# Patient Record
Sex: FEMALE | Race: WHITE | NOT HISPANIC OR LATINO | Employment: FULL TIME | URBAN - NONMETROPOLITAN AREA
[De-identification: names, ages, dates, MRNs, and addresses within clinical notes are randomized per-mention and may not be internally consistent; named-entity substitution may affect disease eponyms.]

---
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
## 2018-01-27 VITALS
TEMPERATURE: 98.6 F | HEIGHT: 67 IN | BODY MASS INDEX: 35.12 KG/M2 | SYSTOLIC BLOOD PRESSURE: 120 MMHG | HEART RATE: 108 BPM | WEIGHT: 223.8 LBS | DIASTOLIC BLOOD PRESSURE: 70 MMHG

## 2018-02-09 ENCOUNTER — DOCUMENTATION ONLY (OUTPATIENT)
Dept: FAMILY MEDICINE | Facility: OTHER | Age: 18
End: 2018-02-09

## 2018-03-26 ENCOUNTER — HOSPITAL ENCOUNTER (EMERGENCY)
Facility: OTHER | Age: 18
Discharge: HOME | End: 2018-03-26
Admitting: NURSE PRACTITIONER
Payer: COMMERCIAL

## 2018-03-26 VITALS
BODY MASS INDEX: 29.55 KG/M2 | OXYGEN SATURATION: 97 % | HEIGHT: 68 IN | SYSTOLIC BLOOD PRESSURE: 117 MMHG | TEMPERATURE: 97.8 F | WEIGHT: 195 LBS | DIASTOLIC BLOOD PRESSURE: 73 MMHG | RESPIRATION RATE: 20 BRPM | HEART RATE: 82 BPM

## 2018-03-26 DIAGNOSIS — R19.7 DIARRHEA, UNSPECIFIED TYPE: ICD-10-CM

## 2018-03-26 DIAGNOSIS — R11.0 NAUSEA: ICD-10-CM

## 2018-03-26 DIAGNOSIS — R10.11 RIGHT UPPER QUADRANT PAIN: ICD-10-CM

## 2018-03-26 LAB
ALBUMIN SERPL-MCNC: 4 G/DL (ref 3.5–5.7)
ALBUMIN UR-MCNC: NEGATIVE MG/DL
ALP SERPL-CCNC: 93 U/L (ref 34–104)
ALT SERPL W P-5'-P-CCNC: 17 U/L (ref 7–52)
ANION GAP SERPL CALCULATED.3IONS-SCNC: 6 MMOL/L (ref 3–14)
APPEARANCE UR: CLEAR
AST SERPL W P-5'-P-CCNC: 15 U/L (ref 13–39)
BASOPHILS # BLD AUTO: 0 10E9/L (ref 0–0.2)
BASOPHILS NFR BLD AUTO: 0.3 %
BILIRUB SERPL-MCNC: 0.3 MG/DL (ref 0.3–1)
BILIRUB UR QL STRIP: NEGATIVE
BUN SERPL-MCNC: 16 MG/DL (ref 7–25)
CALCIUM SERPL-MCNC: 9.4 MG/DL (ref 8.6–10.3)
CHLORIDE SERPL-SCNC: 108 MMOL/L (ref 98–107)
CO2 SERPL-SCNC: 24 MMOL/L (ref 21–31)
COLOR UR AUTO: YELLOW
CREAT SERPL-MCNC: 0.79 MG/DL (ref 0.6–1.2)
DIFFERENTIAL METHOD BLD: ABNORMAL
EOSINOPHIL # BLD AUTO: 0.1 10E9/L (ref 0–0.7)
EOSINOPHIL NFR BLD AUTO: 1.2 %
ERYTHROCYTE [DISTWIDTH] IN BLOOD BY AUTOMATED COUNT: 11.8 % (ref 10–15)
GFR SERPL CREATININE-BSD FRML MDRD: >90 ML/MIN/1.7M2
GLUCOSE SERPL-MCNC: 90 MG/DL (ref 70–105)
GLUCOSE UR STRIP-MCNC: NEGATIVE MG/DL
HCG UR QL: NEGATIVE
HCT VFR BLD AUTO: 41.7 % (ref 35–47)
HGB BLD-MCNC: 14.1 G/DL (ref 11.7–15.7)
HGB UR QL STRIP: NEGATIVE
IMM GRANULOCYTES # BLD: 0 10E9/L (ref 0–0.4)
IMM GRANULOCYTES NFR BLD: 0.3 %
KETONES UR STRIP-MCNC: NEGATIVE MG/DL
LEUKOCYTE ESTERASE UR QL STRIP: NEGATIVE
LYMPHOCYTES # BLD AUTO: 2.2 10E9/L (ref 0.8–5.3)
LYMPHOCYTES NFR BLD AUTO: 37.8 %
MCH RBC QN AUTO: 27 PG (ref 26.5–33)
MCHC RBC AUTO-ENTMCNC: 33.8 G/DL (ref 31.5–36.5)
MCV RBC AUTO: 80 FL (ref 78–100)
MONOCYTES # BLD AUTO: 0.6 10E9/L (ref 0–1.3)
MONOCYTES NFR BLD AUTO: 9.3 %
NEUTROPHILS # BLD AUTO: 3 10E9/L (ref 1.6–8.3)
NEUTROPHILS NFR BLD AUTO: 51.1 %
NITRATE UR QL: NEGATIVE
PH UR STRIP: 5.5 PH (ref 5–7)
PLATELET # BLD AUTO: 385 10E9/L (ref 150–450)
POTASSIUM SERPL-SCNC: 3.9 MMOL/L (ref 3.5–5.1)
PROT SERPL-MCNC: 6.9 G/DL (ref 6.4–8.9)
RBC # BLD AUTO: 5.22 10E12/L (ref 3.8–5.2)
SODIUM SERPL-SCNC: 138 MMOL/L (ref 134–144)
SOURCE: NORMAL
SP GR UR STRIP: 1.02 (ref 1–1.03)
UROBILINOGEN UR STRIP-ACNC: 0.2 EU/DL (ref 0.2–1)
WBC # BLD AUTO: 5.9 10E9/L (ref 4–11)

## 2018-03-26 PROCEDURE — 99284 EMERGENCY DEPT VISIT MOD MDM: CPT | Mod: Z6 | Performed by: NURSE PRACTITIONER

## 2018-03-26 PROCEDURE — 36415 COLL VENOUS BLD VENIPUNCTURE: CPT | Performed by: NURSE PRACTITIONER

## 2018-03-26 PROCEDURE — 25000128 H RX IP 250 OP 636: Performed by: NURSE PRACTITIONER

## 2018-03-26 PROCEDURE — 99284 EMERGENCY DEPT VISIT MOD MDM: CPT | Mod: 25 | Performed by: NURSE PRACTITIONER

## 2018-03-26 PROCEDURE — 96372 THER/PROPH/DIAG INJ SC/IM: CPT | Performed by: NURSE PRACTITIONER

## 2018-03-26 PROCEDURE — 80053 COMPREHEN METABOLIC PANEL: CPT | Performed by: NURSE PRACTITIONER

## 2018-03-26 PROCEDURE — 85025 COMPLETE CBC W/AUTO DIFF WBC: CPT | Performed by: NURSE PRACTITIONER

## 2018-03-26 PROCEDURE — 81025 URINE PREGNANCY TEST: CPT | Performed by: NURSE PRACTITIONER

## 2018-03-26 PROCEDURE — 81003 URINALYSIS AUTO W/O SCOPE: CPT | Performed by: NURSE PRACTITIONER

## 2018-03-26 RX ORDER — KETOROLAC TROMETHAMINE 30 MG/ML
30 INJECTION, SOLUTION INTRAMUSCULAR; INTRAVENOUS ONCE
Status: COMPLETED | OUTPATIENT
Start: 2018-03-26 | End: 2018-03-26

## 2018-03-26 RX ADMIN — KETOROLAC TROMETHAMINE 30 MG: 30 INJECTION, SOLUTION INTRAMUSCULAR at 15:26

## 2018-03-26 NOTE — ED NOTES
Pt comes in with c/o RUQ pain for three days, decreased appetite. Pain is constant. Pt is nauseated no vomiting, Pt c/o diarrhea X3 today.    Dorothy De Luna RN on 3/26/2018 at 1:08 PM

## 2018-03-26 NOTE — ED PROVIDER NOTES
History   No chief complaint on file.    HPI  Kelsie Danielle is a 17 year old female who presents to the emergency department for evaluation of right upper quadrant abdominal pain. She admits that symptoms have been present since this past Friday. She describes the pain is varying in intensity, does worsen postprandial. She describes as a sharp pain without radiation. Additionally noted complaints of nausea and diarrhea for the past 2 days. She has had decreased appetite. Admits to low-grade fever the past 2 days, no chills.    She has a history of GERD and obesity, has been on PPIs in the past. He describes symptoms different than typical reflux.    Problem List:    Patient Active Problem List    Diagnosis Date Noted     Contraception management 02/27/2014     Priority: Medium     Overview:   Started on Sprintec 2/27/58       Childhood obesity 08/05/2010     Priority: Medium        Past Medical History:    Past Medical History:   Diagnosis Date     Acute suppurative otitis media without spontaneous rupture of tympanic membrane      Fever        Past Surgical History:    No past surgical history on file.    Family History:    Family History   Problem Relation Age of Onset     Hypertension Mother      Hypertension     HEART DISEASE Maternal Grandmother      Heart Disease,Open heart surgery, ASD repair?     HEART DISEASE Paternal Grandfather 49     Heart Disease,CABG     Asthma Paternal Grandfather      Asthma     Other - See Comments Paternal Grandfather      Non-hodgkins lymphoma       Social History:  Marital Status:  Single [1]  Social History   Substance Use Topics     Smoking status: Passive Smoke Exposure - Never Smoker     Smokeless tobacco: Never Used     Alcohol use No        Medications:      Acetaminophen (TYLENOL PO)   etonogestrel (IMPLANON/NEXPLANON) 68 MG IMPL   omeprazole (PRILOSEC) 20 MG CR capsule         Review of Systems   Constitutional: Positive for appetite change and fever. Negative for  "chills.   HENT: Negative.    Respiratory: Negative.  Negative for shortness of breath.    Cardiovascular: Negative.    Gastrointestinal: Positive for abdominal pain, diarrhea and nausea. Negative for vomiting.   Genitourinary: Negative.    Skin: Negative.    Neurological: Negative.    Psychiatric/Behavioral: Negative.        Physical Exam   BP: (!) 153/101  Pulse: 103  Temp: 97.8  F (36.6  C)  Resp: 20  Height: 171.5 cm (5' 7.5\")  Weight: 88.5 kg (195 lb)  SpO2: 99 %      Physical Exam   Constitutional: She is oriented to person, place, and time. She appears well-developed and well-nourished. She is active and cooperative.  Non-toxic appearance. No distress.   HENT:   Head: Normocephalic.   Right Ear: Hearing normal.   Left Ear: Hearing normal.   Nose: Nose normal.   Mouth/Throat: Mucous membranes are normal.   Neck: Neck supple.   Cardiovascular: Regular rhythm, S1 normal, S2 normal and normal heart sounds.  Tachycardia present.    Pulmonary/Chest: Effort normal and breath sounds normal. No respiratory distress.   Abdominal: Normal appearance and bowel sounds are normal. She exhibits no distension. There is tenderness in the right upper quadrant. There is no rigidity, no guarding and negative Arguelles's sign.   Neurological: She is alert and oriented to person, place, and time.   Skin: Skin is warm, dry and intact. She is not diaphoretic.   Psychiatric: She has a normal mood and affect. Her speech is normal and behavior is normal.   Nursing note and vitals reviewed.      ED Course     ED Course     Procedures              Critical Care time:  none               Results for orders placed or performed during the hospital encounter of 03/26/18 (from the past 24 hour(s))   CBC with platelets differential   Result Value Ref Range    WBC 5.9 4.0 - 11.0 10e9/L    RBC Count 5.22 (H) 3.8 - 5.2 10e12/L    Hemoglobin 14.1 11.7 - 15.7 g/dL    Hematocrit 41.7 35.0 - 47.0 %    MCV 80 78 - 100 fl    MCH 27.0 26.5 - 33.0 pg    MCHC " 33.8 31.5 - 36.5 g/dL    RDW 11.8 10.0 - 15.0 %    Platelet Count 385 150 - 450 10e9/L    Diff Method Automated Method     % Neutrophils 51.1 %    % Lymphocytes 37.8 %    % Monocytes 9.3 %    % Eosinophils 1.2 %    % Basophils 0.3 %    % Immature Granulocytes 0.3 %    Absolute Neutrophil 3.0 1.6 - 8.3 10e9/L    Absolute Lymphocytes 2.2 0.8 - 5.3 10e9/L    Absolute Monocytes 0.6 0.0 - 1.3 10e9/L    Absolute Eosinophils 0.1 0.0 - 0.7 10e9/L    Absolute Basophils 0.0 0.0 - 0.2 10e9/L    Abs Immature Granulocytes 0.0 0 - 0.4 10e9/L   Comprehensive metabolic panel   Result Value Ref Range    Sodium 138 134 - 144 mmol/L    Potassium 3.9 3.5 - 5.1 mmol/L    Chloride 108 (H) 98 - 107 mmol/L    Carbon Dioxide 24 21 - 31 mmol/L    Anion Gap 6 3 - 14 mmol/L    Glucose 90 70 - 105 mg/dL    Urea Nitrogen 16 7 - 25 mg/dL    Creatinine 0.79 0.60 - 1.20 mg/dL    GFR Estimate >90 >60 mL/min/1.7m2    GFR Estimate If Black >90 >60 mL/min/1.7m2    Calcium 9.4 8.6 - 10.3 mg/dL    Bilirubin Total 0.3 0.3 - 1.0 mg/dL    Albumin 4.0 3.5 - 5.7 g/dL    Protein Total 6.9 6.4 - 8.9 g/dL    Alkaline Phosphatase 93 34 - 104 U/L    ALT 17 7 - 52 U/L    AST 15 13 - 39 U/L   *UA reflex to Microscopic   Result Value Ref Range    Color Urine Yellow     Appearance Urine Clear     Glucose Urine Negative NEG^Negative mg/dL    Bilirubin Urine Negative NEG^Negative    Ketones Urine Negative NEG^Negative mg/dL    Specific Gravity Urine 1.025 1.003 - 1.035    Blood Urine Negative NEG^Negative    pH Urine 5.5 5.0 - 7.0 pH    Protein Albumin Urine Negative NEG^Negative mg/dL    Urobilinogen Urine 0.2 0.2 - 1.0 EU/dL    Nitrite Urine Negative NEG^Negative    Leukocyte Esterase Urine Negative NEG^Negative    Source Midstream Urine    HCG qualitative urine   Result Value Ref Range    HCG Qual Urine Negative NEG^Negative       Medications   ketorolac (TORADOL) injection 30 mg (not administered)       Assessments & Plan (with Medical Decision Making)     I have  reviewed the nursing notes.    I have reviewed the findings, diagnosis, plan and need for follow up with the patient.       New Prescriptions    No medications on file       Final diagnoses:   Right upper quadrant pain   Nausea   Diarrhea, unspecified type     Patient with RUQ pain, nausea and diarrhea. No leukocytosis and liver enzymes normal. She is afebrile currently. Differential includes possible cholelithiasis versus GI virus. Review of lab results does not suggest acute cholecystitis. She will follow-up in clinic within one week if symptoms worsen or fail to improve, consider US imaging at that time.   Patient has no further questions or concerns. Ready for discharge.     3/26/2018   [facility] AND \[facility] [place] [facility]\""     Olivia Woodruff APRN CNP  03/26/18 1519       Olivia Woodruff APRN CNP  04/27/18 1248

## 2018-03-26 NOTE — DISCHARGE INSTRUCTIONS
Continue to monitor symptoms and limit fatty food that could worsen symptoms. If symptoms not improved within one week, follow-up in clinic. Consider US of gallbladder if symptoms not improved.

## 2018-03-26 NOTE — ED AVS SNAPSHOT
[facility]    [address]    [place] [address]    Phone:  [phone]    Fax:  [phone]                                       Kelsie Danielle   MRN: [id number]    Department:  [facility]   Date of Visit:  3/26/2018           Patient Information     Date Of Birth          2000        Your diagnoses for this visit were:     Right upper quadrant pain     Nausea     Diarrhea, unspecified type       Follow-up Information     Follow up with [facility] In 1 week.    Why:  ER follow-up        Discharge Instructions       Continue to monitor symptoms and limit fatty food that could worsen symptoms. If symptoms not improved within one week, follow-up in clinic. Consider US of gallbladder if symptoms not improved.     24 Hour Appointment Hotline       To make an appointment at any [facility], call [phone] ([phone]). If you don't have a family doctor or clinic, we will help you find one. [facility] clinics are conveniently located to serve the needs of you and your family.             Review of your medicines      Our records show that you are taking the medicines listed below. If these are incorrect, please call your family doctor or clinic.        Dose / Directions Last dose taken    etonogestrel 68 MG Impl   Commonly known as:  IMPLANON/NEXPLANON   Dose:  1 each        Inject 1 each Subcutaneous once   Refills:  0        omeprazole 20 MG CR capsule   Commonly known as:  priLOSEC   Dose:  20 mg        Take 20 mg by mouth daily with food   Refills:  0        TYLENOL PO   Dose:  1000 mg        Take 1,000 mg by mouth   Refills:  0                Procedures and tests performed during your visit     *UA reflex to Microscopic    CBC with platelets differential    Comprehensive metabolic panel    HCG qualitative urine      Orders Needing Specimen Collection     None      Pending Results     No orders found from 3/24/2018 to 3/27/2018.     "        Pending Culture Results     No orders found from 3/24/2018 to 3/27/2018.            Thank you for choosing [facility]       Thank you for choosing [facility] for your care. Our goal is always to provide you with excellent care. Hearing back from our patients is one way we can continue to improve our services. Please take a few minutes to complete the written survey that you may receive in the mail after you visit with us. Thank you!        [employer] Interactive Information     [employer] lets you send messages to your doctor, view your test results, renew your prescriptions, schedule appointments and more. To sign up, go to [email or web address] . Click on \"Log in\" on the left side of the screen, which will take you to the Welcome page. Then click on \"Sign up Now\" on the right side of the page.     You will be asked to enter the access code listed below, as well as some personal information. Please follow the directions to create your username and password.     Your access code is: [email or web address]  Expires: 2018  3:31 PM     Your access code will  in 90 days. If you need help or a new code, please call your [facility] clinic or [phone].        Care EveryWhere ID     This is your Care EveryWhere ID. This could be used by other organizations to access your [facility] medical records  [id number]        Equal Access to Services     ANDREWS RODGERS AH: Hadcrystal mitchell [place], waaxda luqadaha, qaybta kaalmada nicole, radha swan. So [facility] [phone].    ATENCIÓN: Si habla español, tiene a moore disposición servicios gratuitos de asistencia lingüística. Llame al [phone].    We comply with applicable federal civil rights laws and [place] laws. We do not discriminate on the basis of race, color, national origin, age, disability, sex, sexual orientation, or gender identity.            After Visit Summary       This is your record. Keep this with you and show to your community " pharmacist(s) and doctor(s) at your next visit.

## 2018-03-26 NOTE — ED NOTES
Pt given water glass, unable to provide urine sample at this time.    Dorothy De Luna RN on 3/26/2018 at 2:43 PM

## 2018-03-26 NOTE — ED AVS SNAPSHOT
[facility]    [address]    [place] [address]    Phone:  [phone]    Fax:  [phone]                                       Kelsie Danielle   MRN: [id number]    Department:  [facility]   Date of Visit:  3/26/2018           After Visit Summary Signature Page     I have received my discharge instructions, and my questions have been answered. I have discussed any challenges I see with this plan with the nurse or doctor.    ..........................................................................................................................................  Patient/Patient Representative Signature      ..........................................................................................................................................  Patient Representative Print Name and Relationship to Patient    ..................................................               ................................................  Date                                            Time    ..........................................................................................................................................  Reviewed by Signature/Title    ...................................................              ..............................................  Date                                                            Time

## 2018-04-27 ASSESSMENT — ENCOUNTER SYMPTOMS
APPETITE CHANGE: 1
PSYCHIATRIC NEGATIVE: 1
CARDIOVASCULAR NEGATIVE: 1
RESPIRATORY NEGATIVE: 1
VOMITING: 0
SHORTNESS OF BREATH: 0
DIARRHEA: 1
FEVER: 1
CHILLS: 0
ABDOMINAL PAIN: 1
NEUROLOGICAL NEGATIVE: 1
NAUSEA: 1

## 2018-05-10 ENCOUNTER — OFFICE VISIT (OUTPATIENT)
Dept: FAMILY MEDICINE | Facility: OTHER | Age: 18
End: 2018-05-10
Attending: FAMILY MEDICINE
Payer: COMMERCIAL

## 2018-05-10 VITALS
DIASTOLIC BLOOD PRESSURE: 82 MMHG | WEIGHT: 251.4 LBS | BODY MASS INDEX: 38.79 KG/M2 | HEART RATE: 80 BPM | SYSTOLIC BLOOD PRESSURE: 128 MMHG

## 2018-05-10 DIAGNOSIS — Z30.017 NEXPLANON INSERTION: ICD-10-CM

## 2018-05-10 DIAGNOSIS — Z11.8 SPECIAL SCREENING EXAMINATION FOR CHLAMYDIAL DISEASE: Primary | ICD-10-CM

## 2018-05-10 DIAGNOSIS — Z30.46 NEXPLANON REMOVAL: ICD-10-CM

## 2018-05-10 LAB
C TRACH DNA SPEC QL PROBE+SIG AMP: NOT DETECTED
N GONORRHOEA DNA SPEC QL PROBE+SIG AMP: NOT DETECTED
SPECIMEN SOURCE: NORMAL

## 2018-05-10 PROCEDURE — 11983 REMOVE/INSERT DRUG IMPLANT: CPT | Performed by: FAMILY MEDICINE

## 2018-05-10 PROCEDURE — 25000128 H RX IP 250 OP 636: Performed by: FAMILY MEDICINE

## 2018-05-10 PROCEDURE — 87591 N.GONORRHOEAE DNA AMP PROB: CPT | Performed by: FAMILY MEDICINE

## 2018-05-10 PROCEDURE — 87491 CHLMYD TRACH DNA AMP PROBE: CPT | Performed by: FAMILY MEDICINE

## 2018-05-10 RX ADMIN — ETONOGESTREL 68 MG: 68 IMPLANT SUBCUTANEOUS at 15:43

## 2018-05-10 NOTE — NURSING NOTE
Patient here to have nexplanon removed and replaced with a new one.  Neda Stovall............................... 5/10/2018 2:57 PM

## 2018-05-10 NOTE — MR AVS SNAPSHOT
"              After Visit Summary   5/10/2018    Kelsie Danielle    MRN: [id number]           Patient Information     Date Of Birth          2000        Visit Information        Provider Department      5/10/2018 2:45 PM Jessica Cain DO [facility]        Today's Diagnoses     Special screening examination for chlamydial disease    -  1    Nexplanon removal        Nexplanon insertion           Follow-ups after your visit        Who to contact     If you have questions or need follow up information about today's clinic visit or your schedule please contact [facility] directly at [phone].  Normal or non-critical lab and imaging results will be communicated to you by [facility] [employer], letter or phone within 4 business days after the clinic has received the results. If you do not hear from us within 7 days, please contact the clinic through [employer] or phone. If you have a critical or abnormal lab result, we will notify you by phone as soon as possible.  Submit refill requests through [employer] or call your pharmacy and they will forward the refill request to us. Please allow 3 business days for your refill to be completed.          Additional Information About Your Visit        MyChart Information     [employer] lets you send messages to your doctor, view your test results, renew your prescriptions, schedule appointments and more. To sign up, go to [email or web address] . Click on \"Log in\" on the left side of the screen, which will take you to the Welcome page. Then click on \"Sign up Now\" on the right side of the page.     You will be asked to enter the access code listed below, as well as some personal information. Please follow the directions to create your username and password.     Your access code is: [email or web address]  Expires: 2018  3:31 PM     Your access code will  in 90 days. If you need help or a new code, please call your [facility] clinic or [phone].   "      Care EveryWhere ID     This is your Care EveryWhere ID. This could be used by other organizations to access your [facility] medical records  [id number]        Your Vitals Were     Pulse BMI (Body Mass Index)                80 38.79 kg/m2           Blood Pressure from Last 3 Encounters:   05/10/18 128/82   03/26/18 117/73   08/14/17 120/70    Weight from Last 3 Encounters:   05/10/18 251 lb 6.4 oz (114 kg) (>99 %)*   03/26/18 195 lb (88.5 kg) (97 %)*   08/14/17 223 lb 12.8 oz (101.5 kg) (99 %)*     * Growth percentiles are based on [facility] 2-20 Years data.              We Performed the Following     GC/Chlamydia by PCR - [facility]     REMOVAL AND REINSERTION NEXPLANON        Primary Care Provider Fax #    Physician No Ref-Primary [phone]       No address on file        Equal Access to Services     BRYAN RODGERS : Hadii rob Sebastian, wachristina mitchell, koleybrishabh kaalmamarina rivera, radha prabhakar . So [facility] [phone].    ATENCIÓN: Si habla español, tiene a moore disposición servicios gratuitos de asistencia lingüística. Llame al [phone].    We comply with applicable federal civil rights laws and [place] laws. We do not discriminate on the basis of race, color, national origin, age, disability, sex, sexual orientation, or gender identity.            Thank you!     Thank you for choosing [facility] AND hospitals  for your care. Our goal is always to provide you with excellent care. Hearing back from our patients is one way we can continue to improve our services. Please take a few minutes to complete the written survey that you may receive in the mail after your visit with us. Thank you!             Your Updated Medication List - Protect others around you: Learn how to safely use, store and throw away your medicines at www.disposemymeds.org.          This list is accurate as of 5/10/18  8:10 PM.  Always use your most recent med list.                   Brand Name Dispense  Instructions for use Diagnosis    etonogestrel 68 MG Impl    IMPLANON/NEXPLANON     Inject 1 each Subcutaneous once        TYLENOL PO      Take 1,000 mg by mouth

## 2018-05-11 NOTE — PROGRESS NOTES
Chief Complaint:  Nexplanon removal and insertion   Subjective:  Patient is a 17 year old . who presents for Nexplanon removal and insertion.  Pt has Nexplanon placed in L arm in 2015  Desires replacement.  Reviewed risk/benefit and alternatives.    Written and verbal informed consent obtained, risks discussed included:  bleeding, irregular menses, infection, pain/discomfort, cost for removal as well as alternative contraception options.    Pt has No allergy to shellfish/betadine.  Last sexual activity months, + condom.  No pain with sex, No bleeding after sex.   No LMP recorded. Patient has had an implant.  Would like GC/CT screening today with urine sample.    Patient Active Problem List   Diagnosis     Childhood obesity     Contraception management     Past Medical History:   Diagnosis Date     Acute suppurative otitis media without spontaneous rupture of tympanic membrane     ,Left     Fever     ,Lyme and Anaplasma titers pending.     History reviewed. No pertinent surgical history.  Allergies   Allergen Reactions     Amoxicillin Rash     Family History   Problem Relation Age of Onset     Hypertension Mother      Hypertension     HEART DISEASE Maternal Grandmother      Heart Disease,Open heart surgery, ASD repair?     HEART DISEASE Paternal Grandfather 49     Heart Disease,CABG     Asthma Paternal Grandfather      Asthma     Other - See Comments Paternal Grandfather      Non-hodgkins lymphoma       Review of Systems  Constitutional: negative  2. Eyes: negative  3. ENT/MOUTH: negative  4. CARDIOVASCULAR: negative  5. RESPIRATORY: negative  6. GASTROINTESTINAL: negative  7. GENITOURINARY: negative  8. MUSCULOSKELETAL: negative  9. SKIN/BREAST: negative  10. NEUROLOGICAL: negative  11. PSYCHIATRIC: negative  12. ENDOCRINE: negative  13. HEMAT/LYMPH: negative  14. ALLER/IMMUN: see allergies       Objective:  Vitals:    05/10/18 1458   BP: 128/82   BP Location: Right arm   Patient Position: Sitting    Cuff Size: Adult Large   Pulse: 80   Weight: 251 lb 6.4 oz (114 kg)     Procedure: Nexplanon Removal from L arm and Placement of new Nexplanon into L Arm  Indication:  Desired continuation of contraception  The exact location of the implant in the arm be verified by palpation in L arm   Chlorhexadine prep used to clean site, allowed to dry.  2cc of 1% lidocaine injected just under the skin along the planned re insertion tunnel.  1 mL 1% lidocaine injected at the marked site for removal.  Downward pressure on proximal end of the implant for stabilization,  bulge noted indicating the distal end of the implant.  Longitudinal incision of 2 mm towards the elbow from distal tip of implant with 11 scapel. Gentle pressure on  the implant towards the incision until the tip is visible.   Forceps gently insert a forceps into the incision - tissue around the implant dissected and implant grasped with forceps. Confirmation of entire implant, 4 cm long, was removed.      Pt. Remained in supine position with L arm bent at 90 degrees.  A second time out was performed.   Position for insertion identified on L upper arm 8cm from medial epicondyl at site of removal. Nexplanon placed subdermally under sterile conditions without difficulty. Sukhjinder in place and confirmed via palpation by provider and patient.  Pressure held, sterile bandage placed. L upper arm wrapped in pressure dressing.  Patient tolerated procedure well.   Complications:  none  EBL:  < 1 mL    Assessment/Plan  -Nexplanon replaced and inserted   - Routine return precautions (bleeding, redness, pain, concern for infection) discussed.  Pt. given card with Lot #, date of insertion, date of removal by 5/10/2021.  Remove the pressure bandage in 24 hours and the small bandage in 3 to 5 days   - RTO in 1 week for recheck prn

## 2018-11-17 ENCOUNTER — OFFICE VISIT (OUTPATIENT)
Dept: FAMILY MEDICINE | Facility: OTHER | Age: 18
End: 2018-11-17
Attending: PHYSICIAN ASSISTANT
Payer: COMMERCIAL

## 2018-11-17 VITALS
BODY MASS INDEX: 41.21 KG/M2 | HEIGHT: 67 IN | TEMPERATURE: 98.9 F | DIASTOLIC BLOOD PRESSURE: 84 MMHG | SYSTOLIC BLOOD PRESSURE: 120 MMHG | WEIGHT: 262.6 LBS | OXYGEN SATURATION: 97 % | HEART RATE: 107 BPM

## 2018-11-17 DIAGNOSIS — R30.0 DYSURIA: Primary | ICD-10-CM

## 2018-11-17 LAB
ALBUMIN UR-MCNC: NEGATIVE MG/DL
APPEARANCE UR: CLEAR
BACTERIA #/AREA URNS HPF: ABNORMAL /HPF
BILIRUB UR QL STRIP: NEGATIVE
COLOR UR AUTO: YELLOW
GLUCOSE UR STRIP-MCNC: NEGATIVE MG/DL
HGB UR QL STRIP: ABNORMAL
KETONES UR STRIP-MCNC: NEGATIVE MG/DL
LEUKOCYTE ESTERASE UR QL STRIP: ABNORMAL
MUCOUS THREADS #/AREA URNS LPF: PRESENT /LPF
NITRATE UR QL: NEGATIVE
NON-SQ EPI CELLS #/AREA URNS LPF: ABNORMAL /LPF
PH UR STRIP: 6 PH (ref 5–9)
RBC #/AREA URNS AUTO: ABNORMAL /HPF
SOURCE: ABNORMAL
SP GR UR STRIP: 1.02 (ref 1–1.03)
UROBILINOGEN UR STRIP-ACNC: 0.2 EU/DL (ref 0.2–1)
WBC #/AREA URNS AUTO: ABNORMAL /HPF

## 2018-11-17 PROCEDURE — 81001 URINALYSIS AUTO W/SCOPE: CPT | Performed by: FAMILY MEDICINE

## 2018-11-17 PROCEDURE — 99213 OFFICE O/P EST LOW 20 MIN: CPT | Performed by: FAMILY MEDICINE

## 2018-11-17 RX ORDER — FLUCONAZOLE 150 MG/1
150 TABLET ORAL ONCE
Qty: 1 TABLET | Refills: 1 | Status: SHIPPED | OUTPATIENT
Start: 2018-11-17 | End: 2018-11-17

## 2018-11-17 RX ORDER — SULFAMETHOXAZOLE/TRIMETHOPRIM 800-160 MG
1 TABLET ORAL 2 TIMES DAILY
Qty: 6 TABLET | Refills: 0 | Status: SHIPPED | OUTPATIENT
Start: 2018-11-17 | End: 2018-11-20

## 2018-11-17 ASSESSMENT — PAIN SCALES - GENERAL: PAINLEVEL: EXTREME PAIN (8)

## 2018-11-17 NOTE — PROGRESS NOTES
"  SUBJECTIVE:   Kelsie Danielle is a 18 year old female who presents to clinic today for the following health issues:  Possible yeast infection  HPI Comments: Patient arrives here because of dysuria.  She states it hurts to urinate.  She also reports frequency.  And hesitancy.  Symptoms been going on for the last couple of days.  No back pain no fevers or chills.  She normally has a vaginal discharge but reports is become itchy.  Denies foul-smelling discharge.  Also d reports a remote chance of STD    Vaginal Problem            Patient Active Problem List    Diagnosis Date Noted     Contraception management 02/27/2014     Priority: Medium     Overview:   Started on Sprintec 2/27/58       Childhood obesity 08/05/2010     Priority: Medium     Past Medical History:   Diagnosis Date     Acute suppurative otitis media without spontaneous rupture of tympanic membrane     2003,Left     Fever     2008,Lyme and Anaplasma titers pending.      No past surgical history on file.    Review of Systems   Genitourinary: Positive for vaginal discharge.        OBJECTIVE:     /84 (BP Location: Right arm, Patient Position: Sitting, Cuff Size: Adult Large)  Pulse 107  Temp 98.9  F (37.2  C) (Tympanic)  Ht 5' 7\" (1.702 m)  Wt 262 lb 9.6 oz (119.1 kg)  SpO2 97%  Breastfeeding? No  BMI 41.13 kg/m2  Body mass index is 41.13 kg/(m^2).  Physical Exam   Constitutional: She appears well-developed.   Pulmonary/Chest: Effort normal.   Abdominal: Soft. She exhibits no distension. There is no tenderness. There is no rebound.   Musculoskeletal: Normal range of motion. She exhibits no edema or tenderness.   Neurological: She is alert.       Diagnostic Test Results:  Results for orders placed or performed in visit on 11/17/18 (from the past 24 hour(s))   Urinalysis w Reflex Microscopic If Positive   Result Value Ref Range    Color Urine Yellow     Appearance Urine Clear     Glucose Urine Negative NEG^Negative mg/dL    Bilirubin Urine " Negative NEG^Negative    Ketones Urine Negative NEG^Negative mg/dL    Specific Gravity Urine 1.025 1.000 - 1.030    Blood Urine Large (A) NEG^Negative    pH Urine 6.0 5.0 - 9.0 pH    Protein Albumin Urine Negative NEG^Negative mg/dL    Urobilinogen Urine 0.2 0.2 - 1.0 EU/dL    Nitrite Urine Negative NEG^Negative    Leukocyte Esterase Urine Small (A) NEG^Negative    Source Midstream Urine    Urine Microscopic   Result Value Ref Range    WBC Urine 0 - 5 OTO5^0 - 5 /HPF    RBC Urine 10-25 (A) OTO2^O - 2 /HPF    Squamous Epithelial /LPF Urine Few FEW^Few /LPF    Bacteria Urine Few (A) NEG^Negative /HPF    Mucous Urine Present (A) NEG^Negative /LPF       ASSESSMENT/PLAN:         1. Dysuria  We will initially treat for UTI.  If she does not get good improvement 1 dose of Diflucan.  If no improvement after that she will need a formal pelvic.  I did offer STD testing but she declined.  - Urinalysis w Reflex Microscopic If Positive  - Urine Microscopic  - sulfamethoxazole-trimethoprim (BACTRIM DS/SEPTRA DS) 800-160 MG per tablet; Take 1 tablet by mouth 2 times daily for 3 days  Dispense: 6 tablet; Refill: 0  - fluconazole (DIFLUCAN) 150 MG tablet; Take 1 tablet (150 mg) by mouth once for 1 dose  Dispense: 1 tablet; Refill: 1      Haris Borjas MD  [facility]

## 2018-11-17 NOTE — NURSING NOTE
Patient presents with possible yeast infection, pain with voiding, burning after voiding, and vaginal discharge for 2 days. Pt is taking cranberry pills.   Deneen Gillespie LPN....................  11/17/2018   10:20 AM

## 2018-11-17 NOTE — MR AVS SNAPSHOT
"              After Visit Summary   11/17/2018    Kelsie Danielle    MRN: [id number]           Patient Information     Date Of Birth          2000        Visit Information        Provider Department      11/17/2018 10:30 AM Haris Borjas MD [facility]        Today's Diagnoses     Dysuria    -  1       Follow-ups after your visit        Your next 10 appointments already scheduled     Dec 14, 2018 10:00 AM CST   Office Visit with Jessica Cain DO   [facility] ([facility])    [address]  [place] [id number] [phone]           Bring a current list of meds and any records pertaining to this visit. For Physicals, please bring immunization records and any forms needing to be filled out. Please arrive 10 minutes early to complete paperwork.              Who to contact     If you have questions or need follow up information about today's clinic visit or your schedule please contact [facility] directly at [phone].  Normal or non-critical lab and imaging results will be communicated to you by [employer], letter or phone within 4 business days after the clinic has received the results. If you do not hear from us within 7 days, please contact the clinic through [employer] or phone. If you have a critical or abnormal lab result, we will notify you by phone as soon as possible.  Submit refill requests through [employer] or call your pharmacy and they will forward the refill request to us. Please allow 3 business days for your refill to be completed.          Additional Information About Your Visit        [employer] Information     [employer] lets you send messages to your doctor, view your test results, renew your prescriptions, schedule appointments and more. To sign up, go to [email or web address] . Click on \"Log in\" on the left side of the screen, which will take you to the Welcome page. Then click on \"Sign up Now\" " "on the right side of the page.     You will be asked to enter the access code listed below, as well as some personal information. Please follow the directions to create your username and password.     Your access code is: [email or web address]  Expires: 2/15/2019 11:07 AM     Your access code will  in 90 days. If you need help or a new code, please call your [facility] or [phone].        Care EveryWhere ID     This is your Care EveryWhere ID. This could be used by other organizations to access your [facility] medical records  [id number]        Your Vitals Were     Pulse Temperature Height Pulse Oximetry Breastfeeding? BMI (Body Mass Index)    107 98.9  F (37.2  C) (Tympanic) 5' 7\" (1.702 m) 97% No 41.13 kg/m2       Blood Pressure from Last 3 Encounters:   18 120/84   05/10/18 128/82   18 117/73    Weight from Last 3 Encounters:   18 262 lb 9.6 oz (119.1 kg)   05/10/18 251 lb 6.4 oz (114 kg) (>99 %)*   18 195 lb (88.5 kg) (97 %)*     * Growth percentiles are based on CDC 2-20 Years data.              We Performed the Following     Urinalysis w Reflex Microscopic If Positive     Urine Microscopic          Today's Medication Changes          These changes are accurate as of 18 11:07 AM.  If you have any questions, ask your nurse or doctor.               Start taking these medicines.        Dose/Directions    fluconazole 150 MG tablet   Commonly known as:  DIFLUCAN   Used for:  Dysuria   Started by:  Haris Borjas MD        Dose:  150 mg   Take 1 tablet (150 mg) by mouth once for 1 dose   Quantity:  1 tablet   Refills:  1       sulfamethoxazole-trimethoprim 800-160 MG per tablet   Commonly known as:  BACTRIM DS/SEPTRA DS   Used for:  Dysuria   Started by:  Haris Borjas MD        Dose:  1 tablet   Take 1 tablet by mouth 2 times daily for 3 days   Quantity:  6 tablet   Refills:  0            Where to get your medicines      These medications were sent to [id number] - " [place], [place] - [address], [facility] [address]     Phone:  [phone]     sulfamethoxazole-trimethoprim 800-160 MG per tablet         Some of these will need a paper prescription and others can be bought over the counter.  Ask your nurse if you have questions.     Bring a paper prescription for each of these medications     fluconazole 150 MG tablet                Primary Care Provider Fax #    Physician No Ref-Primary [phone]       No address on file        Equal Access to Services     ANDREWS RODGERS : Hadii aad ku hadasho Soomaali, waaxda luqadaha, qaybta kaalmada adeegyada, waxay keithin haymegan juniormadinadinora prabhakar . So [facility] [phone].    ATENCIÓN: Si habla español, tiene a moore disposición servicios gratuitos de asistencia lingüística. [facility] [phone].    We comply with applicable federal civil rights laws and [place] laws. We do not discriminate on the basis of race, color, national origin, age, disability, sex, sexual orientation, or gender identity.            Thank you!     Thank you for choosing [facility] AND [facility]  for your care. Our goal is always to provide you with excellent care. Hearing back from our patients is one way we can continue to improve our services. Please take a few minutes to complete the written survey that you may receive in the mail after your visit with us. Thank you!             Your Updated Medication List - Protect others around you: Learn how to safely use, store and throw away your medicines at www.disposemymeds.org.          This list is accurate as of 11/17/18 11:07 AM.  Always use your most recent med list.                   Brand Name Dispense Instructions for use Diagnosis    etonogestrel 68 MG Impl    IMPLANON/NEXPLANON     Inject 1 each Subcutaneous once        fluconazole 150 MG tablet    DIFLUCAN    1 tablet    Take 1 tablet (150 mg) by mouth once for 1 dose    Dysuria       sulfamethoxazole-trimethoprim  800-160 MG per tablet    BACTRIM DS/SEPTRA DS    6 tablet    Take 1 tablet by mouth 2 times daily for 3 days    Dysuria       TYLENOL PO      Take 1,000 mg by mouth

## 2018-11-20 ENCOUNTER — TELEPHONE (OUTPATIENT)
Dept: FAMILY MEDICINE | Facility: OTHER | Age: 18
End: 2018-11-20

## 2018-11-20 NOTE — TELEPHONE ENCOUNTER
Patient is wanting to know if she can test positive for herpes without having an outbreak of it?   She states she was possibly exposed to it 3-4 years ago and is having pain while urinating. She was treated for a UTI and yeast infection with no relief and is wondering if it is herpes now.        Dee Leone LPN on 11/20/2018 at 12:36 PM

## 2018-11-20 NOTE — TELEPHONE ENCOUNTER
Herpes doesn't cause pain with urination unless there is an open sore contributing to the pain.  Also the blood test for HSV types 1 and 2 will be positive if you have ever had it or even have a history of cold sores; which does not tell us any new information.  If there is a lesion or sore, we can take a swab of that area to test for HSV as a culture.    Jessica Cain, DO

## 2018-11-20 NOTE — TELEPHONE ENCOUNTER
Left message to call back....................  11/20/2018   1:45 PM  Marlen Taylor LPN  11/20/2018  1:45 PM

## 2018-11-21 ENCOUNTER — OFFICE VISIT (OUTPATIENT)
Dept: FAMILY MEDICINE | Facility: OTHER | Age: 18
End: 2018-11-21
Attending: NURSE PRACTITIONER
Payer: COMMERCIAL

## 2018-11-21 VITALS
BODY MASS INDEX: 41.66 KG/M2 | TEMPERATURE: 98 F | WEIGHT: 266 LBS | SYSTOLIC BLOOD PRESSURE: 146 MMHG | DIASTOLIC BLOOD PRESSURE: 90 MMHG | HEART RATE: 97 BPM | OXYGEN SATURATION: 98 %

## 2018-11-21 DIAGNOSIS — N76.0 VAGINITIS AND VULVOVAGINITIS: Primary | ICD-10-CM

## 2018-11-21 LAB
ALBUMIN UR-MCNC: ABNORMAL MG/DL
APPEARANCE UR: CLEAR
BACTERIA #/AREA URNS HPF: ABNORMAL /HPF
BILIRUB UR QL STRIP: NEGATIVE
COLOR UR AUTO: YELLOW
GLUCOSE UR STRIP-MCNC: NEGATIVE MG/DL
HGB UR QL STRIP: ABNORMAL
KETONES UR STRIP-MCNC: NEGATIVE MG/DL
LEUKOCYTE ESTERASE UR QL STRIP: ABNORMAL
MUCOUS THREADS #/AREA URNS LPF: PRESENT /LPF
NITRATE UR QL: NEGATIVE
NON-SQ EPI CELLS #/AREA URNS LPF: ABNORMAL /LPF
PH UR STRIP: 7 PH (ref 5–9)
RBC #/AREA URNS AUTO: ABNORMAL /HPF
SOURCE: ABNORMAL
SP GR UR STRIP: 1.02 (ref 1–1.03)
SPECIMEN SOURCE: NORMAL
UROBILINOGEN UR STRIP-ACNC: 1 EU/DL (ref 0.2–1)
WBC #/AREA URNS AUTO: ABNORMAL /HPF
WET PREP SPEC: NORMAL

## 2018-11-21 PROCEDURE — 81001 URINALYSIS AUTO W/SCOPE: CPT | Performed by: NURSE PRACTITIONER

## 2018-11-21 PROCEDURE — 87086 URINE CULTURE/COLONY COUNT: CPT | Performed by: NURSE PRACTITIONER

## 2018-11-21 PROCEDURE — 99214 OFFICE O/P EST MOD 30 MIN: CPT | Performed by: NURSE PRACTITIONER

## 2018-11-21 PROCEDURE — 87210 SMEAR WET MOUNT SALINE/INK: CPT | Performed by: NURSE PRACTITIONER

## 2018-11-21 PROCEDURE — 87529 HSV DNA AMP PROBE: CPT | Performed by: NURSE PRACTITIONER

## 2018-11-21 RX ORDER — SULFAMETHOXAZOLE/TRIMETHOPRIM 800-160 MG
1 TABLET ORAL 2 TIMES DAILY
COMMUNITY
End: 2018-11-27

## 2018-11-21 NOTE — PATIENT INSTRUCTIONS
Will have you follow up with PCP next week for re-evaluation and to review results.   Tuesday at 11 AM.       Preventing Vaginitis     Use mild, unscented soap when you bathe or shower to avoid irritating your vagina.    Vaginitis is irritation or infection of the vagina or vulva (the outside opening of the vagina). Vaginitis can be caused by bacteria, viruses, parasites, or yeast. Chemicals (such as in perfumes or soaps or in spermicides) can sometimes be a cause. Vaginitis can be caused by hormone changes in pregnancy or with menopause. You can help prevent vaginitis. Follow the tips below. And see your healthcare provider if you have any symptoms.  Hygiene    Avoid chemicals. Do not use vaginal sprays. Do not use scented toilet paper or tampons that are scented. Sprays and scents have chemicals that can irritate your vagina.    Do not douche unless you are told to by your healthcare provider. Douching is rarely needed. And it upsets the normal balance in the vagina.    Wash yourself well. Wash the outer vaginal area (vulva) every day with mild, unscented soap. Keep it as dry as possible.    Wipe correctly. Make sure to wipe from front to back after a bowel movement. This helps keep from spreading bacteria from your anus to your vagina.    Change your tampon often. During your period, make sure to change your tampon as often as directed on the package. This allows the normal flow of vaginal discharge and blood.  Lifestyle    Limit your number of sexual partners. The more partners you have, the greater your risk of infection. Using condoms helps reduce your risk.    Get enough sleep. Sleep helps keep your body s immune system healthy. This helps you fight infection.    Lose weight, if needed. Excess weight can reduce air circulation around your vagina. This can increase your risk of infection.    Exercise regularly. Regular activity helps keep your body healthy.    Take antibiotics only as directed. Antibiotics can  change the normal chemical balance in the vagina.    Clothing    Don t sit in wet clothes. Yeast thrives when it s warm and damp.    Don t wear tight pants. And don t wear tights, leggings, or hose without a cotton crotch. These types of clothing trap warmth and moisture.    Wear cotton underwear. Cotton lets air circulate around the vagina.  Symptoms of vaginitis    Irritation, swelling, or itching of the genital area    Vaginal discharge    Bad vaginal odor    Pain or burning during urination   Date Last Reviewed: 12/1/2016 2000-2018 [employer]. [address] [place] [address]. All rights reserved. This information is not intended as a substitute for professional medical care. Always follow your healthcare professional's instructions.

## 2018-11-21 NOTE — PROGRESS NOTES
Chief Complaint   Patient presents with     Vaginal Problem      SUBJECTIVE:     Exposed to herpes 5 years ago.   Her sister is now  to him and has herpes.   Has vaginal pain, hurts to sit down and go to the bathroom.   Was here 5 days ago, UTI and wet prep done. Treated for UTI and yeast with no improvement of symptoms.   LMP - doesn't get one. Has had her period since July.   Has had the same partner now for 2 years. Had chlamydia last year, boyfriend had gotten it from his ex-girlfriend.     No LMP recorded. Patient has had an implant.    OBJECTIVE:   She appears well, afebrile.  Abdomen: benign, soft, nontender, no masses.  Pelvic Exam: wet prep and viral swab collected, pelvic exam not performed, exam chaperoned by nurse.     ASSESSMENT:   Results for orders placed or performed in visit on 11/21/18   UA reflex to Microscopic and Culture   Result Value Ref Range    Color Urine Yellow     Appearance Urine Clear     Glucose Urine Negative NEG^Negative mg/dL    Bilirubin Urine Negative NEG^Negative    Ketones Urine Negative NEG^Negative mg/dL    Specific Gravity Urine 1.020 1.000 - 1.030    Blood Urine Large (A) NEG^Negative    pH Urine 7.0 5.0 - 9.0 pH    Protein Albumin Urine Trace (A) NEG^Negative mg/dL    Urobilinogen Urine 1.0 0.2 - 1.0 EU/dL    Nitrite Urine Negative NEG^Negative    Leukocyte Esterase Urine Small (A) NEG^Negative    Source Midstream Urine    Urine Microscopic   Result Value Ref Range    WBC Urine 10-25 (A) OTO5^0 - 5 /HPF    RBC Urine 10-25 (A) OTO2^O - 2 /HPF    Squamous Epithelial /LPF Urine Many (A) FEW^Few /LPF    Bacteria Urine Many (A) NEG^Negative /HPF    Mucous Urine Present (A) NEG^Negative /LPF   Wet Prep, Genital   Result Value Ref Range    Specimen Description Vagina     Wet Prep No clue cells seen     Wet Prep No yeast seen     Wet Prep No Trichomonas seen     Wet Prep Many WBCs seen      Kelsie was seen today for vaginal problem.    Diagnoses and all orders for this  visit:    Vaginitis and vulvovaginitis  -     Wet Prep, Genital  -     Herpes Simplex Virus 1 and 2 IgG; Future  -     UA reflex to Microscopic and Culture  -     Cancel: GC/Chlamydia by PCR  -     Cancel: Herpes Simplex Virus 1 and 2 IgG  -     Cancel: GC/Chlamydia by PCR  -     HSV 1 and 2 DNA by PCR  -     Urine Microscopic  -     Urine Culture Aerobic Bacterial      Reviewed available labs with patient.   Will need to analisa for HSV and GC/Chlam to come back.   Advised to schedule f/u with PCP to review next week.   ROV prn if symptoms persist or worsen.    DEEPA Haskins, NP-C  [facility]

## 2018-11-21 NOTE — MR AVS SNAPSHOT
After Visit Summary   11/21/2018    Kelsie Danielle    MRN: [id number]           Patient Information     Date Of Birth          2000        Visit Information        Provider Department      11/21/2018 11:30 AM Chelle Sanford NP [facility]        Today's Diagnoses     Vaginitis and vulvovaginitis    -  1      Care Instructions    Will have you follow up with PCP next week for re-evaluation and to review results.   Tuesday at 11 AM.       Preventing Vaginitis     Use mild, unscented soap when you bathe or shower to avoid irritating your vagina.    Vaginitis is irritation or infection of the vagina or vulva (the outside opening of the vagina). Vaginitis can be caused by bacteria, viruses, parasites, or yeast. Chemicals (such as in perfumes or soaps or in spermicides) can sometimes be a cause. Vaginitis can be caused by hormone changes in pregnancy or with menopause. You can help prevent vaginitis. Follow the tips below. And see your healthcare provider if you have any symptoms.  Hygiene    Avoid chemicals. Do not use vaginal sprays. Do not use scented toilet paper or tampons that are scented. Sprays and scents have chemicals that can irritate your vagina.    Do not douche unless you are told to by your healthcare provider. Douching is rarely needed. And it upsets the normal balance in the vagina.    Wash yourself well. Wash the outer vaginal area (vulva) every day with mild, unscented soap. Keep it as dry as possible.    Wipe correctly. Make sure to wipe from front to back after a bowel movement. This helps keep from spreading bacteria from your anus to your vagina.    Change your tampon often. During your period, make sure to change your tampon as often as directed on the package. This allows the normal flow of vaginal discharge and blood.  Lifestyle    Limit your number of sexual partners. The more partners you have, the greater your risk of infection. Using condoms  helps reduce your risk.    Get enough sleep. Sleep helps keep your body s immune system healthy. This helps you fight infection.    Lose weight, if needed. Excess weight can reduce air circulation around your vagina. This can increase your risk of infection.    Exercise regularly. Regular activity helps keep your body healthy.    Take antibiotics only as directed. Antibiotics can change the normal chemical balance in the vagina.    Clothing    Don t sit in wet clothes. Yeast thrives when it s warm and damp.    Don t wear tight pants. And don t wear tights, leggings, or hose without a cotton crotch. These types of clothing trap warmth and moisture.    Wear cotton underwear. Cotton lets air circulate around the vagina.  Symptoms of vaginitis    Irritation, swelling, or itching of the genital area    Vaginal discharge    Bad vaginal odor    Pain or burning during urination   Date Last Reviewed: 12/1/2016 2000-2018 [employer]. [address] [place] [address]. All rights reserved. This information is not intended as a substitute for professional medical care. Always follow your healthcare professional's instructions.                Follow-ups after your visit        Follow-up notes from your care team     Return for as scheduled.      Your next 10 appointments already scheduled     Nov 27, 2018 11:00 AM CST   SHORT with Jessica Cain DO   [facility]    [address]  [place] [address]   [phone]            Dec 14, 2018 10:00 AM CST   Office Visit with Jessica Cain DO   [facility] ([facility])    [address]  [place] [address]   [phone]           Bring a current list of meds and any records pertaining to this visit. For Physicals, please bring immunization records and any forms needing to be filled out. Please arrive 10 minutes early to complete  "paperwork.              Who to contact     If you have questions or need follow up information about today's clinic visit or your schedule please contact [facility] directly at [phone].  Normal or non-critical lab and imaging results will be communicated to you by MyChart, letter or phone within 4 business days after the clinic has received the results. If you do not hear from us within 7 days, please contact the clinic through [employer] or phone. If you have a critical or abnormal lab result, we will notify you by phone as soon as possible.  Submit refill requests through [employer] or call your pharmacy and they will forward the refill request to us. Please allow 3 business days for your refill to be completed.          Additional Information About Your Visit        [facility] Information     [employer] lets you send messages to your doctor, view your test results, renew your prescriptions, schedule appointments and more. To sign up, go to [email or web address] . Click on \"Log in\" on the left side of the screen, which will take you to the Welcome page. Then click on \"Sign up Now\" on the right side of the page.     You will be asked to enter the access code listed below, as well as some personal information. Please follow the directions to create your username and password.     Your access code is: [email or web address]  Expires: 2/15/2019 11:07 AM     Your access code will  in 90 days. If you need help or a new code, please call your [place] clinic or [phone].        Care EveryWhere ID     This is your Care EveryWhere ID. This could be used by other organizations to access your [place] medical records  [id number]        Your Vitals Were     Pulse Temperature Pulse Oximetry Breastfeeding? BMI (Body Mass Index)       97 98  F (36.7  C) (Tympanic) 98% No 41.66 kg/m2        Blood Pressure from Last 3 Encounters:   18 146/90   18 120/84   05/10/18 128/82    Weight from Last 3 " Encounters:   11/21/18 266 lb (120.7 kg)   11/17/18 262 lb 9.6 oz (119.1 kg)   05/10/18 251 lb 6.4 oz (114 kg) (>99 %)*     * Growth percentiles are based on [facility] 2-20 Years data.              We Performed the Following     HSV 1 and 2 DNA by PCR     UA reflex to Microscopic and Culture     Urine Culture Aerobic Bacterial     Urine Microscopic     Wet Prep, Genital        Primary Care Provider Fax #    Physician No Ref-Primary [phone]       No address on file        Equal Access to Services     ANDREWS RODGERS : Hadii rob ku hadasho Soomaali, waaxda luqadaha, qaybta kaalmada adeegyada, radha prabhakar . So [facility] [phone].    ATENCIÓN: Si habla español, tiene a moore disposición servicios gratuitos de asistencia lingüística. Llame al [phone].    We comply with applicable federal civil rights laws and [place] laws. We do not discriminate on the basis of race, color, national origin, age, disability, sex, sexual orientation, or gender identity.            Thank you!     Thank you for choosing [facility] AND [facility]  for your care. Our goal is always to provide you with excellent care. Hearing back from our patients is one way we can continue to improve our services. Please take a few minutes to complete the written survey that you may receive in the mail after your visit with us. Thank you!             Your Updated Medication List - Protect others around you: Learn how to safely use, store and throw away your medicines at www.disposemymeds.org.          This list is accurate as of 11/21/18  1:41 PM.  Always use your most recent med list.                   Brand Name Dispense Instructions for use Diagnosis    etonogestrel 68 MG Impl    IMPLANON/NEXPLANON     Inject 1 each Subcutaneous once        sulfamethoxazole-trimethoprim 800-160 MG per tablet    BACTRIM DS/SEPTRA DS     Take 1 tablet by mouth 2 times daily        TYLENOL PO      Take 1,000 mg by mouth

## 2018-11-21 NOTE — NURSING NOTE
Patient presents to clinic today for sores in her genitals. The sores hurt. Patient slept with a man 5 years ago. He is now  to her sister. Her sister has herpes. Patient is now worried about this.  Antonette Alvarez CMA..............11/21/2018........11:56 AM    Medication Reconciliation: complete    Antonette Alvarez CMA

## 2018-11-22 LAB
BACTERIA SPEC CULT: NORMAL
HSV1 DNA SPEC QL NAA+PROBE: POSITIVE
HSV2 DNA SPEC QL NAA+PROBE: NEGATIVE
SPECIMEN SOURCE: ABNORMAL
SPECIMEN SOURCE: NORMAL

## 2018-11-27 ENCOUNTER — OFFICE VISIT (OUTPATIENT)
Dept: FAMILY MEDICINE | Facility: OTHER | Age: 18
End: 2018-11-27
Attending: FAMILY MEDICINE
Payer: COMMERCIAL

## 2018-11-27 VITALS
HEART RATE: 90 BPM | SYSTOLIC BLOOD PRESSURE: 128 MMHG | BODY MASS INDEX: 41.75 KG/M2 | DIASTOLIC BLOOD PRESSURE: 80 MMHG | HEIGHT: 67 IN | WEIGHT: 266 LBS | TEMPERATURE: 98.2 F

## 2018-11-27 DIAGNOSIS — A60.00 GENITAL HERPES SIMPLEX, UNSPECIFIED SITE: Primary | ICD-10-CM

## 2018-11-27 DIAGNOSIS — N89.8 VAGINAL ITCHING: ICD-10-CM

## 2018-11-27 DIAGNOSIS — Z31.69 PRE-CONCEPTION COUNSELING: ICD-10-CM

## 2018-11-27 LAB
ALBUMIN UR-MCNC: NEGATIVE MG/DL
APPEARANCE UR: CLEAR
BACTERIA #/AREA URNS HPF: ABNORMAL /HPF
BILIRUB UR QL STRIP: NEGATIVE
COLOR UR AUTO: YELLOW
GLUCOSE UR STRIP-MCNC: NEGATIVE MG/DL
HGB UR QL STRIP: ABNORMAL
KETONES UR STRIP-MCNC: NEGATIVE MG/DL
LEUKOCYTE ESTERASE UR QL STRIP: NEGATIVE
NITRATE UR QL: NEGATIVE
NON-SQ EPI CELLS #/AREA URNS LPF: ABNORMAL /LPF
PH UR STRIP: 6.5 PH (ref 5–9)
RBC #/AREA URNS AUTO: ABNORMAL /HPF
SOURCE: ABNORMAL
SP GR UR STRIP: 1.02 (ref 1–1.03)
UROBILINOGEN UR STRIP-ACNC: 0.2 EU/DL (ref 0.2–1)
WBC #/AREA URNS AUTO: ABNORMAL /HPF

## 2018-11-27 PROCEDURE — 99213 OFFICE O/P EST LOW 20 MIN: CPT | Performed by: FAMILY MEDICINE

## 2018-11-27 PROCEDURE — 81001 URINALYSIS AUTO W/SCOPE: CPT | Performed by: FAMILY MEDICINE

## 2018-11-27 RX ORDER — MISOPROSTOL 200 UG/1
400 TABLET ORAL ONCE
Qty: 2 TABLET | Refills: 0 | Status: SHIPPED | OUTPATIENT
Start: 2018-11-27 | End: 2018-11-27

## 2018-11-27 RX ORDER — VALACYCLOVIR HYDROCHLORIDE 500 MG/1
500 TABLET, FILM COATED ORAL 2 TIMES DAILY
Qty: 6 TABLET | Refills: 3 | Status: SHIPPED | OUTPATIENT
Start: 2018-11-27 | End: 2021-05-12

## 2018-11-27 ASSESSMENT — PAIN SCALES - GENERAL: PAINLEVEL: NO PAIN (0)

## 2018-11-27 NOTE — MR AVS SNAPSHOT
"              After Visit Summary   11/27/2018    Kelsie Danielle    MRN: [id number]           Patient Information     Date Of Birth          2000        Visit Information        Provider Department      11/27/2018 11:00 AM Jessica Cain DO [facility]        Today's Diagnoses     Genital herpes simplex, unspecified site    -  1    Vaginal itching        Pre-conception counseling           Follow-ups after your visit        Your next 10 appointments already scheduled     Dec 14, 2018 10:00 AM CST   Office Visit with Jessica Cain DO   [facility] ([facility])    [address]  [place] [address] [phone]           Bring a current list of meds and any records pertaining to this visit. For Physicals, please bring immunization records and any forms needing to be filled out. Please arrive 10 minutes early to complete paperwork.              Who to contact     If you have questions or need follow up information about today's clinic visit or your schedule please contact [facility] directly at [phone].  Normal or non-critical lab and imaging results will be communicated to you by [employer], letter or phone within 4 business days after the clinic has received the results. If you do not hear from us within 7 days, please contact the clinic through [employer] or phone. If you have a critical or abnormal lab result, we will notify you by phone as soon as possible.  Submit refill requests through [email or web address] or call your pharmacy and they will forward the refill request to us. Please allow 3 business days for your refill to be completed.          Additional Information About Your Visit        MyChart Information     [email or web address] lets you send messages to your doctor, view your test results, renew your prescriptions, schedule appointments and more. To sign up, go to [email or web address] . Click on \"Log in\" on the left " "side of the screen, which will take you to the Welcome page. Then click on \"Sign up Now\" on the right side of the page.     You will be asked to enter the access code listed below, as well as some personal information. Please follow the directions to create your username and password.     Your access code is: [email or web address]  Expires: 2/15/2019 11:07 AM     Your access code will  in 90 days. If you need help or a new code, please call your [facility] or [phone].        Care EveryWhere ID     This is your Care EveryWhere ID. This could be used by other organizations to access your [place] medical records  [id number]        Your Vitals Were     Pulse Temperature Height Breastfeeding? BMI (Body Mass Index)       90 98.2  F (36.8  C) (Tympanic) 5' 7\" (1.702 m) No 41.66 kg/m2        Blood Pressure from Last 3 Encounters:   18 128/80   18 146/90   18 120/84    Weight from Last 3 Encounters:   18 266 lb (120.7 kg)   18 266 lb (120.7 kg)   18 262 lb 9.6 oz (119.1 kg)              We Performed the Following     Urinalysis w Reflex Microscopic If Positive     Urine Microscopic          Today's Medication Changes          These changes are accurate as of 18 12:52 PM.  If you have any questions, ask your nurse or doctor.               Start taking these medicines.        Dose/Directions    misoprostol 200 MCG tablet   Commonly known as:  CYTOTEC   Used for:  Pre-conception counseling   Started by:  Jessica Cain DO        Dose:  400 mcg   Take 2 tablets (400 mcg) by mouth once for 1 dose   Quantity:  2 tablet   Refills:  0       valACYclovir 500 MG tablet   Commonly known as:  VALTREX   Used for:  Genital herpes simplex, unspecified site   Started by:  Jessica Cain DO        Dose:  500 mg   Take 1 tablet (500 mg) by mouth 2 times daily for 3 days.   Quantity:  6 tablet   Refills:  3            Where to get your medicines      These medications were sent to " [id number] [address] [address]     Phone:  [phone]     misoprostol 200 MCG tablet    valACYclovir 500 MG tablet                Primary Care Provider Fax #    Physician No Ref-Primary [phone]       No address on file        Equal Access to Services     ANDREWS RODGERS : Hadii aad ku hadasho Soomaali, waaxda luqadaha, qaybta kaalmada adeegyada, radha parkerin haychrisn adequiana khmadinadinora prabhakar . So [facility] [phone].    ATENCIÓN: Si habla español, tiene a moore disposición servicios gratuitos de asistencia lingüística. Guille al [phone].    We comply with applicable federal civil rights laws and [place] laws. We do not discriminate on the basis of race, color, national origin, age, disability, sex, sexual orientation, or gender identity.            Thank you!     Thank you for choosing [facility]  for your care. Our goal is always to provide you with excellent care. Hearing back from our patients is one way we can continue to improve our services. Please take a few minutes to complete the written survey that you may receive in the mail after your visit with us. Thank you!             Your Updated Medication List - Protect others around you: Learn how to safely use, store and throw away your medicines at www.disposemymeds.org.          This list is accurate as of 11/27/18 12:52 PM.  Always use your most recent med list.                   Brand Name Dispense Instructions for use Diagnosis    etonogestrel 68 MG Impl    IMPLANON/NEXPLANON     Inject 1 each Subcutaneous once        misoprostol 200 MCG tablet    CYTOTEC    2 tablet    Take 2 tablets (400 mcg) by mouth once for 1 dose    Pre-conception counseling       valACYclovir 500 MG tablet    VALTREX    6 tablet    Take 1 tablet (500 mg) by mouth 2 times daily for 3 days.    Genital herpes simplex, unspecified site

## 2018-11-27 NOTE — PROGRESS NOTES
"Nursing Notes:   Opal Mckeon LPN  11/27/2018 11:16 AM  Unsigned  Chief Complaint   Patient presents with     Urinary Problem    Patient states that she was having pain with urinating and sores that have now resolved.     Initial /80 (BP Location: Right arm, Patient Position: Sitting, Cuff Size: Adult Large)  Pulse 90  Temp 98.2  F (36.8  C) (Tympanic)  Ht 5' 7\" (1.702 m)  Wt 266 lb (120.7 kg)  Breastfeeding? No  BMI 41.66 kg/m2 Estimated body mass index is 41.66 kg/(m^2) as calculated from the following:    Height as of this encounter: 5' 7\" (1.702 m).    Weight as of this encounter: 266 lb (120.7 kg).  Medication Reconciliation: complete    Opal Mckeon LPN    SUBJECTIVE:   Kelsie Danielle is a 18 year old female who presents to clinic today for the following health issues:    JHONNY Iglesias is here for follow up to testing performed in the [facility] last week.  She was having ~8-10 days of perineal pressure/burning, and ultimately developed sores later into the course.  Had exposure to genital herpes ~4-5 years ago; who is now in a relationship with Kelsie's sister.  She also has genital herpes.  Sores have resolves.  No fever/chills.  No blood in urine.    Also has an appointment in ~2 weeks to discuss contraception.  Has been spotting for ~4-5 months while on the Nexplanon.  Previously had without any difficulty.  Desiring it to be removed; uncertain what she wants to do next.  Likes not having period; boyfriend is also a  and may be leaving for extended periods of time - and may use condoms.      Patient Active Problem List    Diagnosis Date Noted     Contraception management 02/27/2014     Priority: Medium     Overview:   Started on Sprintec 2/27/58       Childhood obesity 08/05/2010     Priority: Medium     Past Medical History:   Diagnosis Date     Acute suppurative otitis media without spontaneous rupture of tympanic membrane     2003,Left     Fever     2008,Lyme and Anaplasma " "titers pending.      History reviewed. No pertinent surgical history.  Family History   Problem Relation Age of Onset     Hypertension Mother      Hypertension     HEART DISEASE Maternal Grandmother      Heart Disease,Open heart surgery, ASD repair?     HEART DISEASE Paternal Grandfather 49     Heart Disease,CABG     Asthma Paternal Grandfather      Asthma     Other - See Comments Paternal Grandfather      Non-hodgkins lymphoma     Social History   Substance Use Topics     Smoking status: Never Smoker     Smokeless tobacco: Never Used     Alcohol use No     Social History     Social History Narrative    Lives with mom and 2 siblings.  Agueda Grandmother  Mary Carmen Mom     Current Outpatient Prescriptions   Medication Sig Dispense Refill     etonogestrel (IMPLANON/NEXPLANON) 68 MG IMPL Inject 1 each Subcutaneous once       Allergies   Allergen Reactions     Amoxicillin Rash     Review of Systems   All other systems reviewed and are negative.     OBJECTIVE:     /80 (BP Location: Right arm, Patient Position: Sitting, Cuff Size: Adult Large)  Pulse 90  Temp 98.2  F (36.8  C) (Tympanic)  Ht 5' 7\" (1.702 m)  Wt 266 lb (120.7 kg)  Breastfeeding? No  BMI 41.66 kg/m2  Body mass index is 41.66 kg/(m^2).  Physical Exam   Constitutional: She appears well-developed and well-nourished. No distress.   HENT:   Head: Normocephalic and atraumatic.   Right Ear: External ear normal.   Left Ear: External ear normal.   Cardiovascular: Normal rate and normal heart sounds.    Pulmonary/Chest: Effort normal and breath sounds normal.   Skin: She is not diaphoretic.   Psychiatric: She has a normal mood and affect. Judgment normal.   Nursing note and vitals reviewed.    Diagnostic Test Results:  none     ASSESSMENT/PLAN:     1. Genital herpes simplex, unspecified site  Discussed outbreak therapy vs suppressive therapy.  Rx for valtrex 500mg BID x 3 days for each outbreak.  Reviewed if she is getting more than 8 per year to let me know. "  Reviewed recommendations for pregnancy, partner reduction, use of condoms.  - valACYclovir (VALTREX) 500 MG tablet; Take 1 tablet (500 mg) by mouth 2 times daily for 3 days.  Dispense: 6 tablet; Refill: 3    2. Vaginal itching  Repeat UA due to recent UTI with blood noted on UA.  However this is not likely contributing to current symptoms.  Blood accounted for by vaginal spotting.  - Urinalysis w Reflex Microscopic If Positive  - Urine Microscopic    3. Pre-conception counseling  Reviewed some options for contraception; including possible IUD placement on 12/14/18.  More information given for her to read; and Rx for cytotec Rx in case she would like to proceed with IUD placement.  Instructed to take the night before.  Follow up then for Nexplanon removal and further discussion.  - misoprostol (CYTOTEC) 200 MCG tablet; Take 2 tablets (400 mcg) by mouth once for 1 dose  Dispense: 2 tablet; Refill: 0      DO KASSIDY [facility] \[facility] [place] [facility]\""

## 2018-11-27 NOTE — NURSING NOTE
"Chief Complaint   Patient presents with     Urinary Problem    Patient states that she was having pain with urinating and sores that have now resolved.     Initial /80 (BP Location: Right arm, Patient Position: Sitting, Cuff Size: Adult Large)  Pulse 90  Temp 98.2  F (36.8  C) (Tympanic)  Ht 5' 7\" (1.702 m)  Wt 266 lb (120.7 kg)  Breastfeeding? No  BMI 41.66 kg/m2 Estimated body mass index is 41.66 kg/(m^2) as calculated from the following:    Height as of this encounter: 5' 7\" (1.702 m).    Weight as of this encounter: 266 lb (120.7 kg).  Medication Reconciliation: complete    Opal Mckeon LPN    "

## 2020-11-04 ENCOUNTER — TELEPHONE (OUTPATIENT)
Dept: FAMILY MEDICINE | Facility: OTHER | Age: 20
End: 2020-11-04

## 2020-11-04 NOTE — TELEPHONE ENCOUNTER
After verifying pts name and date of birth with patient, patient states she is needing a letter to return to work after having her COVID swab. Notified pt that we did not do the COVID swab here at [facility] and she should call  in [place] to get her result and bring those to her employer.  Myriam Keller LPN

## 2021-01-12 ENCOUNTER — OFFICE VISIT (OUTPATIENT)
Dept: FAMILY MEDICINE | Facility: OTHER | Age: 21
End: 2021-01-12
Attending: FAMILY MEDICINE
Payer: COMMERCIAL

## 2021-01-12 VITALS
WEIGHT: 287 LBS | SYSTOLIC BLOOD PRESSURE: 156 MMHG | BODY MASS INDEX: 44.95 KG/M2 | HEART RATE: 113 BPM | RESPIRATION RATE: 18 BRPM | OXYGEN SATURATION: 97 % | DIASTOLIC BLOOD PRESSURE: 84 MMHG | TEMPERATURE: 98.2 F

## 2021-01-12 DIAGNOSIS — B36.0 PITYRIASIS VERSICOLOR: Primary | ICD-10-CM

## 2021-01-12 DIAGNOSIS — F41.8 SITUATIONAL ANXIETY: ICD-10-CM

## 2021-01-12 DIAGNOSIS — F50.819 BINGE EATING DISORDER: ICD-10-CM

## 2021-01-12 DIAGNOSIS — E66.9 OBESITY, UNSPECIFIED CLASSIFICATION, UNSPECIFIED OBESITY TYPE, UNSPECIFIED WHETHER SERIOUS COMORBIDITY PRESENT: ICD-10-CM

## 2021-01-12 LAB
ALBUMIN SERPL-MCNC: 4.3 G/DL (ref 3.5–5.7)
ALP SERPL-CCNC: 74 U/L (ref 34–104)
ALT SERPL W P-5'-P-CCNC: 22 U/L (ref 7–52)
ANION GAP SERPL CALCULATED.3IONS-SCNC: 8 MMOL/L (ref 3–14)
AST SERPL W P-5'-P-CCNC: 18 U/L (ref 13–39)
BASOPHILS # BLD AUTO: 0 10E9/L (ref 0–0.2)
BASOPHILS NFR BLD AUTO: 0.4 %
BILIRUB SERPL-MCNC: 0.3 MG/DL (ref 0.3–1)
BUN SERPL-MCNC: 12 MG/DL (ref 7–25)
CALCIUM SERPL-MCNC: 9.6 MG/DL (ref 8.6–10.3)
CHLORIDE SERPL-SCNC: 106 MMOL/L (ref 98–107)
CHOLEST SERPL-MCNC: 177 MG/DL
CO2 SERPL-SCNC: 25 MMOL/L (ref 21–31)
CREAT SERPL-MCNC: 0.7 MG/DL (ref 0.6–1.2)
DIFFERENTIAL METHOD BLD: ABNORMAL
EOSINOPHIL # BLD AUTO: 0.1 10E9/L (ref 0–0.7)
EOSINOPHIL NFR BLD AUTO: 1 %
ERYTHROCYTE [DISTWIDTH] IN BLOOD BY AUTOMATED COUNT: 11.6 % (ref 10–15)
ESTRADIOL SERPL-MCNC: 40 PG/ML
GFR SERPL CREATININE-BSD FRML MDRD: >90 ML/MIN/{1.73_M2}
GLUCOSE SERPL-MCNC: 114 MG/DL (ref 70–105)
HCT VFR BLD AUTO: 41.6 % (ref 35–47)
HDLC SERPL-MCNC: 27 MG/DL (ref 23–92)
HGB BLD-MCNC: 13.8 G/DL (ref 11.7–15.7)
IMM GRANULOCYTES # BLD: 0 10E9/L (ref 0–0.4)
IMM GRANULOCYTES NFR BLD: 0.3 %
LDLC SERPL CALC-MCNC: 94 MG/DL
LYMPHOCYTES # BLD AUTO: 3.4 10E9/L (ref 0.8–5.3)
LYMPHOCYTES NFR BLD AUTO: 34.5 %
MCH RBC QN AUTO: 27.4 PG (ref 26.5–33)
MCHC RBC AUTO-ENTMCNC: 33.2 G/DL (ref 31.5–36.5)
MCV RBC AUTO: 83 FL (ref 78–100)
MONOCYTES # BLD AUTO: 0.7 10E9/L (ref 0–1.3)
MONOCYTES NFR BLD AUTO: 7.4 %
NEUTROPHILS # BLD AUTO: 5.5 10E9/L (ref 1.6–8.3)
NEUTROPHILS NFR BLD AUTO: 56.4 %
NONHDLC SERPL-MCNC: 150 MG/DL
PLATELET # BLD AUTO: 460 10E9/L (ref 150–450)
POTASSIUM SERPL-SCNC: 4.4 MMOL/L (ref 3.5–5.1)
PROT SERPL-MCNC: 7 G/DL (ref 6.4–8.9)
RBC # BLD AUTO: 5.03 10E12/L (ref 3.8–5.2)
SODIUM SERPL-SCNC: 139 MMOL/L (ref 134–144)
T4 FREE SERPL-MCNC: 0.89 NG/DL (ref 0.6–1.6)
TESTOST SERPL-MCNC: 52 NG/DL (ref 14–76)
TRIGL SERPL-MCNC: 280 MG/DL
TSH SERPL DL<=0.05 MIU/L-ACNC: 3.06 IU/ML (ref 0.34–5.6)
WBC # BLD AUTO: 9.8 10E9/L (ref 4–11)

## 2021-01-12 PROCEDURE — 80053 COMPREHEN METABOLIC PANEL: CPT | Mod: ZL | Performed by: FAMILY MEDICINE

## 2021-01-12 PROCEDURE — 84443 ASSAY THYROID STIM HORMONE: CPT | Mod: ZL | Performed by: FAMILY MEDICINE

## 2021-01-12 PROCEDURE — 80061 LIPID PANEL: CPT | Mod: ZL | Performed by: FAMILY MEDICINE

## 2021-01-12 PROCEDURE — 99214 OFFICE O/P EST MOD 30 MIN: CPT | Performed by: FAMILY MEDICINE

## 2021-01-12 PROCEDURE — 82670 ASSAY OF TOTAL ESTRADIOL: CPT | Mod: ZL | Performed by: FAMILY MEDICINE

## 2021-01-12 PROCEDURE — 85025 COMPLETE CBC W/AUTO DIFF WBC: CPT | Mod: ZL | Performed by: FAMILY MEDICINE

## 2021-01-12 PROCEDURE — 36415 COLL VENOUS BLD VENIPUNCTURE: CPT | Mod: ZL | Performed by: FAMILY MEDICINE

## 2021-01-12 PROCEDURE — 84439 ASSAY OF FREE THYROXINE: CPT | Mod: ZL | Performed by: FAMILY MEDICINE

## 2021-01-12 PROCEDURE — 84403 ASSAY OF TOTAL TESTOSTERONE: CPT | Mod: ZL | Performed by: FAMILY MEDICINE

## 2021-01-12 RX ORDER — LISDEXAMFETAMINE DIMESYLATE 30 MG/1
30 CAPSULE ORAL DAILY
Qty: 30 CAPSULE | Refills: 0 | Status: SHIPPED | OUTPATIENT
Start: 2021-02-11 | End: 2021-03-13

## 2021-01-12 RX ORDER — KETOCONAZOLE 20 MG/G
CREAM TOPICAL DAILY
Qty: 60 G | Refills: 2 | Status: SHIPPED | OUTPATIENT
Start: 2021-01-12 | End: 2021-05-12

## 2021-01-12 RX ORDER — LISDEXAMFETAMINE DIMESYLATE 30 MG/1
30 CAPSULE ORAL DAILY
Qty: 30 CAPSULE | Refills: 0 | Status: SHIPPED | OUTPATIENT
Start: 2021-01-12 | End: 2021-02-11

## 2021-01-12 RX ORDER — ALPRAZOLAM 0.5 MG
0.5 TABLET ORAL DAILY PRN
Qty: 30 TABLET | Refills: 1 | Status: SHIPPED | OUTPATIENT
Start: 2021-01-12 | End: 2022-03-17

## 2021-01-12 SDOH — HEALTH STABILITY: MENTAL HEALTH: HOW OFTEN DO YOU HAVE A DRINK CONTAINING ALCOHOL?: NOT ASKED

## 2021-01-12 SDOH — HEALTH STABILITY: MENTAL HEALTH: HOW MANY STANDARD DRINKS CONTAINING ALCOHOL DO YOU HAVE ON A TYPICAL DAY?: NOT ASKED

## 2021-01-12 SDOH — HEALTH STABILITY: MENTAL HEALTH: HOW OFTEN DO YOU HAVE 6 OR MORE DRINKS ON ONE OCCASION?: NOT ASKED

## 2021-01-12 ASSESSMENT — PAIN SCALES - GENERAL: PAINLEVEL: NO PAIN (0)

## 2021-01-12 ASSESSMENT — PATIENT HEALTH QUESTIONNAIRE - PHQ9: SUM OF ALL RESPONSES TO PHQ QUESTIONS 1-9: 13

## 2021-01-12 NOTE — PROGRESS NOTES
"  SUBJECTIVE:   Kelsie Danielle is a 20 year old female who presents to clinic today for the following health issues:    JHONNY Iglesias is here for concerns of a rash that is chronic in nature; but is now gradually spreading to involve her back.  She has tried many OTC creams, topical steroids to the area without significant improvement.  Heat, ? seems to make it worse.  Not bothersome, itchy, weepy, etc in nature.  Recent Covid: N  Others around her with symptoms: N    She also is desiring to discuss some anxiety that has been increasing. 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  Therefore, she is having a hard time \"connecting\" with the other aids now as they don't see her as a manager.  Transition to this role has been difficult.  Up at night.  Worrying.  Ruminating about the work day.  Finding she is getting anxious about the next day.  Can wake up multiple times through the night.  Has self medicated with marijuana in the past.  She would potentially be interested in medical marijuana as an option.  Has previously been on sertraline, which made her feel like a zombie.  This trial was approximately 3 months in duration.  She has also been on Seroquel to help with sleep and moods.  She is uncertain about the effect of this medication.  She is finding hard time losing weight as well.  She has tried decreasing and even stopping her intake of pop for the last year and has not had any weight loss.  + binge eating.  Would be interested in trying Vyvanse to see if that is helpful.    She is due for a physical, Pap smear.  She is currently on the Nexplanon, and rarely has menstrual cycles.    Patient Active Problem List    Diagnosis Date Noted     Genital herpes simplex, unspecified site 11/27/2018     Priority: Medium     Contraception management 02/27/2014     Priority: Medium     Overview:   Started on Sprintec 2/27/58       Childhood obesity 08/05/2010     Priority: " Medium     Past Medical History:   Diagnosis Date     Acute suppurative otitis media without spontaneous rupture of tympanic membrane     2003,Left     Fever     2008,Lyme and Anaplasma titers pending.      No past surgical history on file.  Family History   Problem Relation Age of Onset     Hypertension Mother         Hypertension     Heart Disease Maternal Grandmother         Heart Disease,Open heart surgery, ASD repair?     Heart Disease Paternal Grandfather 49        Heart Disease,CABG     Asthma Paternal Grandfather         Asthma     Other - See Comments Paternal Grandfather         Non-hodgkins lymphoma     Social History     Tobacco Use     Smoking status: Never Smoker     Smokeless tobacco: Never Used   Substance Use Topics     Alcohol use: Yes     Comment: occasionally     Social History     Social History Narrative    Lives with mom and 2 siblings.  Agueda Grandmother  Mary Carmen Mom     Current Outpatient Medications   Medication Sig Dispense Refill     ALPRAZolam (XANAX) 0.5 MG tablet Take 1 tablet (0.5 mg) by mouth daily as needed for anxiety 30 tablet 1     ketoconazole (NIZORAL) 2 % external cream Apply topically daily 60 g 2     lisdexamfetamine (VYVANSE) 30 MG capsule Take 1 capsule (30 mg) by mouth daily 30 capsule 0     [START ON 2/11/2021] lisdexamfetamine (VYVANSE) 30 MG capsule Take 1 capsule (30 mg) by mouth daily 30 capsule 0     etonogestrel (IMPLANON/NEXPLANON) 68 MG IMPL Inject 1 each Subcutaneous once       valACYclovir (VALTREX) 500 MG tablet Take 1 tablet (500 mg) by mouth 2 times daily for 3 days. 6 tablet 3     Allergies   Allergen Reactions     Amoxicillin Rash     Review of Systems   Constitutional: Positive for fatigue. Negative for appetite change, chills and fever.   Respiratory: Negative for cough, choking and shortness of breath.    Cardiovascular: Negative for chest pain.   Skin: Positive for rash. Negative for wound.   Psychiatric/Behavioral: Positive for sleep disturbance.  Negative for hallucinations and suicidal ideas. The patient is nervous/anxious. The patient is not hyperactive.    All other systems reviewed and are negative.       OBJECTIVE:     BP (!) 156/84   Pulse 113   Temp 98.2  F (36.8  C) (Tympanic)   Resp 18   Wt 130.2 kg (287 lb)   SpO2 97%   BMI 44.95 kg/m    Body mass index is 44.95 kg/m .  Physical Exam  Vitals signs and nursing note reviewed.   Constitutional:       Appearance: Normal appearance. She is obese.   HENT:      Head: Normocephalic and atraumatic.      Right Ear: Tympanic membrane and ear canal normal.      Left Ear: Tympanic membrane and ear canal normal.   Neck:      Musculoskeletal: Normal range of motion.   Cardiovascular:      Rate and Rhythm: Normal rate and regular rhythm.   Pulmonary:      Effort: Pulmonary effort is normal.      Breath sounds: Normal breath sounds.   Skin:     Capillary Refill: Capillary refill takes less than 2 seconds.             Comments: Well demarcated but serpiginous border of hyperpigmented only slightly raised plaques.  Involving between breasts and in lower back, following spine.   Neurological:      Mental Status: She is alert.   Psychiatric:         Mood and Affect: Mood normal.         Diagnostic Test Results:  Results for orders placed or performed in visit on 01/12/21   Testosterone, Total     Status: None   Result Value Ref Range    Testosterone Total 52 14 - 76 ng/dL   Estradiol     Status: None   Result Value Ref Range    Estradiol 40 pg/mL   CBC and Differential     Status: Abnormal   Result Value Ref Range    WBC 9.8 4.0 - 11.0 10e9/L    RBC Count 5.03 3.8 - 5.2 10e12/L    Hemoglobin 13.8 11.7 - 15.7 g/dL    Hematocrit 41.6 35.0 - 47.0 %    MCV 83 78 - 100 fl    MCH 27.4 26.5 - 33.0 pg    MCHC 33.2 31.5 - 36.5 g/dL    RDW 11.6 10.0 - 15.0 %    Platelet Count 460 (H) 150 - 450 10e9/L    Diff Method Automated Method     % Neutrophils 56.4 %    % Lymphocytes 34.5 %    % Monocytes 7.4 %    % Eosinophils 1.0 %     % Basophils 0.4 %    % Immature Granulocytes 0.3 %    Absolute Neutrophil 5.5 1.6 - 8.3 10e9/L    Absolute Lymphocytes 3.4 0.8 - 5.3 10e9/L    Absolute Monocytes 0.7 0.0 - 1.3 10e9/L    Absolute Eosinophils 0.1 0.0 - 0.7 10e9/L    Absolute Basophils 0.0 0.0 - 0.2 10e9/L    Abs Immature Granulocytes 0.0 0 - 0.4 10e9/L   Comprehensive Metabolic Panel     Status: Abnormal   Result Value Ref Range    Sodium 139 134 - 144 mmol/L    Potassium 4.4 3.5 - 5.1 mmol/L    Chloride 106 98 - 107 mmol/L    Carbon Dioxide 25 21 - 31 mmol/L    Anion Gap 8 3 - 14 mmol/L    Glucose 114 (H) 70 - 105 mg/dL    Urea Nitrogen 12 7 - 25 mg/dL    Creatinine 0.70 0.60 - 1.20 mg/dL    GFR Estimate >90 >60 mL/min/[1.73_m2]    GFR Estimate If Black >90 >60 mL/min/[1.73_m2]    Calcium 9.6 8.6 - 10.3 mg/dL    Bilirubin Total 0.3 0.3 - 1.0 mg/dL    Albumin 4.3 3.5 - 5.7 g/dL    Protein Total 7.0 6.4 - 8.9 g/dL    Alkaline Phosphatase 74 34 - 104 U/L    ALT 22 7 - 52 U/L    AST 18 13 - 39 U/L   Lipid Panel     Status: Abnormal   Result Value Ref Range    Cholesterol 177 <200 mg/dL    Triglycerides 280 (H) <150 mg/dL    HDL Cholesterol 27 23 - 92 mg/dL    LDL Cholesterol Calculated 94 <100 mg/dL    Non HDL Cholesterol 150 (H) <130 mg/dL   T4, Free     Status: None   Result Value Ref Range    T4 Free 0.89 0.60 - 1.60 ng/dL   TSH     Status: None   Result Value Ref Range    Thyrotropin 3.06 0.34 - 5.60 IU/mL       ASSESSMENT/PLAN:     1. Pityriasis versicolor  Worsening; Rx for ketoconazole topical.  If not effective, discussed treatment with oral itraconazole vs fluconazole.  Will notify me in ~1-2 weeks if not improving.  - ketoconazole (NIZORAL) 2 % external cream; Apply topically daily  Dispense: 60 g; Refill: 2    2. Situational anxiety  Acute on chronic.  Mostly with new position change at work recently.  Rx for xanax to take primarily in the evening for relaxation.  Consideration of daily medication due to likely chronic stress with work  until she adjusts.  - ALPRAZolam (XANAX) 0.5 MG tablet; Take 1 tablet (0.5 mg) by mouth daily as needed for anxiety  Dispense: 30 tablet; Refill: 1    3. Binge eating disorder  Chronic, with obesity.  Discussed possible PCOS, will obtain labs as ordered below to evaluate for other cause (thyroid, possible PCOS).  Will plan for two month trial of Vyvanse for binge eating.  Discussed schedule 2 medications and desirability/abuse potential.  Will need follow up in the office if she desire to continue.  - TSH; Future  - T4, Free; Future  - Lipid Panel; Future  - Comprehensive Metabolic Panel; Future  - CBC and Differential; Future  - Estradiol; Future  - Testosterone, Total; Future  - lisdexamfetamine (VYVANSE) 30 MG capsule; Take 1 capsule (30 mg) by mouth daily  Dispense: 30 capsule; Refill: 0  - lisdexamfetamine (VYVANSE) 30 MG capsule; Take 1 capsule (30 mg) by mouth daily  Dispense: 30 capsule; Refill: 0  - Testosterone, Total  - Estradiol  - CBC and Differential  - Comprehensive Metabolic Panel  - Lipid Panel  - T4, Free  - TSH    4. Obesity, unspecified classification, unspecified obesity type, unspecified whether serious comorbidity present  In addition to #3.  See above.  Encouraged healthy lifestyle.  Cut out sugary beverages, work on getting 150 minutes of physical activity weekly, reduce carbohydrates in diet to 80-90g/day to start (plan for further reduction if able/tolerated).  - TSH; Future  - T4, Free; Future  - Lipid Panel; Future  - Comprehensive Metabolic Panel; Future  - CBC and Differential; Future  - Estradiol; Future  - Testosterone, Total; Future  - Testosterone, Total  - Estradiol  - CBC and Differential  - Comprehensive Metabolic Panel  - Lipid Panel  - T4, Free  - TSH      Jessica Cain, [facility] AND \[facility]\""

## 2021-01-12 NOTE — NURSING NOTE
"Chief Complaint   Patient presents with     Derm Problem       Initial BP (!) 158/84   Pulse 113   Temp 98.2  F (36.8  C) (Tympanic)   Resp 18   Wt 130.2 kg (287 lb)   SpO2 97%   BMI 44.95 kg/m   Estimated body mass index is 44.95 kg/m  as calculated from the following:    Height as of 11/27/18: 1.702 m (5' 7\").    Weight as of this encounter: 130.2 kg (287 lb).  Medication Reconciliation: complete    Marlen Taylor LPN  "

## 2021-01-13 ENCOUNTER — MYC MEDICAL ADVICE (OUTPATIENT)
Dept: FAMILY MEDICINE | Facility: OTHER | Age: 21
End: 2021-01-13

## 2021-01-14 NOTE — TELEPHONE ENCOUNTER
Please initiate a PA on her vyvanse if not already done.  Marlen Taylor LPN, PELONN  1/14/2021  11:44 AM

## 2021-01-15 ENCOUNTER — HEALTH MAINTENANCE LETTER (OUTPATIENT)
Age: 21
End: 2021-01-15

## 2021-01-15 ASSESSMENT — ENCOUNTER SYMPTOMS
WOUND: 0
CHILLS: 0
APPETITE CHANGE: 0
NERVOUS/ANXIOUS: 1
FEVER: 0
FATIGUE: 1
HALLUCINATIONS: 0
HYPERACTIVE: 0
COUGH: 0
SHORTNESS OF BREATH: 0
CHOKING: 0
SLEEP DISTURBANCE: 1

## 2021-02-04 ENCOUNTER — TELEPHONE (OUTPATIENT)
Dept: FAMILY MEDICINE | Facility: OTHER | Age: 21
End: 2021-02-04

## 2021-02-04 NOTE — TELEPHONE ENCOUNTER
Fax from [facility] stating that the vyvanse is still not approved. They are requesting to consider an alternative.  Marlen Taylor LPN, LPN  2/4/2021  8:43 AM

## 2021-02-11 ENCOUNTER — TELEPHONE (OUTPATIENT)
Dept: FAMILY MEDICINE | Facility: OTHER | Age: 21
End: 2021-02-11

## 2021-02-11 NOTE — TELEPHONE ENCOUNTER
Please call patient.  Her RX for Vivance is not covered by her insurance.  She needs a different formulary option.       Danette Caceres on 2/11/2021 at 4:30 PM

## 2021-02-12 NOTE — TELEPHONE ENCOUNTER
Patient notified. She does not want to do the weight management referral at this time.  Marlen Taylor, ADRIAN, LPN  2/12/2021  1:37 PM

## 2021-02-12 NOTE — TELEPHONE ENCOUNTER
No other stimulant is used for Binge Eating; therefore I would recommend a Weight Management referral to [facility] if she wants to proceed with treatment.  Let me know how she would want to proceed.    Jessica Cain, DO

## 2021-02-12 NOTE — TELEPHONE ENCOUNTER
Left message to call back....................  2/12/2021   10:28 AM  Marlen Taylor LPN, LPN  2/12/2021  10:28 AM

## 2021-05-12 ENCOUNTER — OFFICE VISIT (OUTPATIENT)
Dept: FAMILY MEDICINE | Facility: OTHER | Age: 21
End: 2021-05-12
Attending: FAMILY MEDICINE
Payer: COMMERCIAL

## 2021-05-12 VITALS
OXYGEN SATURATION: 98 % | TEMPERATURE: 97.9 F | WEIGHT: 271 LBS | HEART RATE: 88 BPM | RESPIRATION RATE: 16 BRPM | BODY MASS INDEX: 41.07 KG/M2 | DIASTOLIC BLOOD PRESSURE: 84 MMHG | HEIGHT: 68 IN | SYSTOLIC BLOOD PRESSURE: 134 MMHG

## 2021-05-12 DIAGNOSIS — Z30.46 ENCOUNTER FOR NEXPLANON REMOVAL: ICD-10-CM

## 2021-05-12 DIAGNOSIS — Z30.09 BIRTH CONTROL COUNSELING: Primary | ICD-10-CM

## 2021-05-12 DIAGNOSIS — Z23 NEED FOR TDAP VACCINATION: ICD-10-CM

## 2021-05-12 PROCEDURE — 11982 REMOVE DRUG IMPLANT DEVICE: CPT | Mod: XU | Performed by: FAMILY MEDICINE

## 2021-05-12 PROCEDURE — 90715 TDAP VACCINE 7 YRS/> IM: CPT | Performed by: FAMILY MEDICINE

## 2021-05-12 PROCEDURE — 99213 OFFICE O/P EST LOW 20 MIN: CPT | Mod: 25 | Performed by: FAMILY MEDICINE

## 2021-05-12 PROCEDURE — 90471 IMMUNIZATION ADMIN: CPT | Performed by: FAMILY MEDICINE

## 2021-05-12 RX ORDER — NORETHINDRONE ACETATE AND ETHINYL ESTRADIOL 1MG-20(21)
1 KIT ORAL DAILY
Qty: 84 TABLET | Refills: 0 | Status: SHIPPED | OUTPATIENT
Start: 2021-05-12 | End: 2021-12-08

## 2021-05-12 ASSESSMENT — ENCOUNTER SYMPTOMS
CHILLS: 0
FEVER: 0

## 2021-05-12 ASSESSMENT — PAIN SCALES - GENERAL: PAINLEVEL: NO PAIN (0)

## 2021-05-12 ASSESSMENT — MIFFLIN-ST. JEOR: SCORE: 2029.81

## 2021-05-12 NOTE — PROGRESS NOTES
"Assessment & Plan     Birth control counseling  She would like time for her menstrual cycle to self-regulate but would be interested in starting OCP afterwards.  Prescription provided.  Counseled on importance of consistent barrier contraception use with sexual encounters.  Discussed that current evidence regarding efficacy of birth control methods has only been studied in women at ideal weight.  She verbalized understanding.  - norethindrone-ethinyl estradiol (JUNEL FE 1/20) 1-20 MG-MCG tablet; Take 1 tablet by mouth daily    Encounter for Nexplanon removal  Nexplanon removed without complication.    Need for Tdap vaccination  - TDAP VACCINE (Adacel, Boostrix)  [[id number]]    Yolanda Oquendo, [facility] AND [facility]   Kelsie is a 20 year old who presents for the following health issues:    HPI   Has had Nexplanon in place since February 2016 and presents to have it removed today.  She tolerated it well but reports that she thinks it caused some weight gain.  She did not have her menstrual cycle on the implant.  She is considering what birth control options she would like to try in the future.    Review of Systems   Constitutional: Negative for chills and fever.          Objective    /84   Pulse 88   Temp 97.9  F (36.6  C) (Temporal)   Resp 16   Ht 1.715 m (5' 7.5\")   Wt 122.9 kg (271 lb)   SpO2 98%   BMI 41.82 kg/m    Body mass index is 41.82 kg/m .  Physical Exam  Constitutional:       General: She is not in acute distress.     Appearance: Normal appearance. She is obese. She is not ill-appearing.   Musculoskeletal:      Comments: Nexplanon palpable in left upper extremity.   Neurological:      Mental Status: She is alert.   Psychiatric:         Mood and Affect: Mood normal.        Nexplanon palpated in left upper extremity.  Area cleaned with Chloraprep.  3 mL Lidocaine 1% injected at distal end of palpable implant.  #15 blade used to make a linea incision at distal end of " palpable implant.  Implant guided through incision and grasped with straight needle .  Gentle traction applied until entirety of implant guided through incision.  Hemostasis achieved with pressure and dressing applied.  Patient tolerated procedure well.

## 2021-05-12 NOTE — NURSING NOTE
"Chief Complaint   Patient presents with     Procedure     Nexplanon removal Left arm         Initial /84   Pulse 88   Temp 97.9  F (36.6  C) (Temporal)   Resp 16   Ht 1.715 m (5' 7.5\")   Wt 122.9 kg (271 lb)   SpO2 98%   BMI 41.82 kg/m   Estimated body mass index is 41.82 kg/m  as calculated from the following:    Height as of this encounter: 1.715 m (5' 7.5\").    Weight as of this encounter: 122.9 kg (271 lb).         Medication Reconciliation: Complete      Norma J. Gosselin, LPN   "

## 2021-10-01 ENCOUNTER — E-VISIT (OUTPATIENT)
Dept: URGENT CARE | Facility: URGENT CARE | Age: 21
End: 2021-10-01
Payer: COMMERCIAL

## 2021-10-01 DIAGNOSIS — R05.9 COUGH: Primary | ICD-10-CM

## 2021-10-01 PROCEDURE — 99421 OL DIG E/M SVC 5-10 MIN: CPT | Performed by: FAMILY MEDICINE

## 2021-10-01 NOTE — PATIENT INSTRUCTIONS
Dear Kelsie Danielle    After reviewing your responses, I've been able to diagnose you with likely an allergic cough. Cough due to allergy is caused by mucus from the back of your nose which can travel down the back of your throat and irritate your lungs. This causes swelling and irritation of air passages and causes a cough. Exposure to pollens or dust or cigarette smoke can worsen this.     To treat allergic cough, the main goal is to avoid the triggers if you know what they are and the can be avoided and to treat the nasal congestion that causes coughing. This can be done with allergy medications including antihistamine pills, nasal sprays, and decongestants, many of which are available over the counter.     I have sent tessalon perls and also would recommend getting  covid test which has been ordered for you and also do decongestants to the pharmacy you indicated.     If your symptoms worsen or are not improving in 4 days, please contact your primary care provider for an appointment or visit any of our convenient Walk-in Care or Urgent Care Centers to be seen which can be found on our website here.    Thanks again for choosing us as your health care partner,    Dinora Chew MD

## 2021-10-09 ENCOUNTER — HEALTH MAINTENANCE LETTER (OUTPATIENT)
Age: 21
End: 2021-10-09

## 2021-12-08 ENCOUNTER — OFFICE VISIT (OUTPATIENT)
Dept: FAMILY MEDICINE | Facility: OTHER | Age: 21
End: 2021-12-08
Attending: PHYSICIAN ASSISTANT
Payer: COMMERCIAL

## 2021-12-08 VITALS
RESPIRATION RATE: 18 BRPM | TEMPERATURE: 97.9 F | OXYGEN SATURATION: 98 % | HEART RATE: 74 BPM | BODY MASS INDEX: 43.26 KG/M2 | SYSTOLIC BLOOD PRESSURE: 122 MMHG | DIASTOLIC BLOOD PRESSURE: 80 MMHG | HEIGHT: 67 IN | WEIGHT: 275.6 LBS

## 2021-12-08 DIAGNOSIS — Z20.822 SUSPECTED 2019 NOVEL CORONAVIRUS INFECTION: ICD-10-CM

## 2021-12-08 DIAGNOSIS — R51.9 ACUTE NONINTRACTABLE HEADACHE, UNSPECIFIED HEADACHE TYPE: Primary | ICD-10-CM

## 2021-12-08 PROCEDURE — U0003 INFECTIOUS AGENT DETECTION BY NUCLEIC ACID (DNA OR RNA); SEVERE ACUTE RESPIRATORY SYNDROME CORONAVIRUS 2 (SARS-COV-2) (CORONAVIRUS DISEASE [COVID-19]), AMPLIFIED PROBE TECHNIQUE, MAKING USE OF HIGH THROUGHPUT TECHNOLOGIES AS DESCRIBED BY CMS-2020-01-R: HCPCS | Mod: ZL | Performed by: PHYSICIAN ASSISTANT

## 2021-12-08 PROCEDURE — 99213 OFFICE O/P EST LOW 20 MIN: CPT | Performed by: PHYSICIAN ASSISTANT

## 2021-12-08 PROCEDURE — C9803 HOPD COVID-19 SPEC COLLECT: HCPCS

## 2021-12-08 ASSESSMENT — MIFFLIN-ST. JEOR: SCORE: 2037.74

## 2021-12-08 ASSESSMENT — PAIN SCALES - GENERAL: PAINLEVEL: MILD PAIN (3)

## 2021-12-08 NOTE — LETTER
[facility] AND HOSPITAL  [address]  [place] [address]  Phone: [phone]  Fax: [phone]    December 8, 2021        Kelsie Danielle  [address] [facility] [address]          To whom it may concern:    RE: Kelsie Danielle    Patient was seen and treated today at our clinic.    COVID test in process.      If COVID negative, OK to return to work as long as fever free without use of antipyretics x 24 hours. If negative, alternative diagnosis is viral cold.    If COVID positive, out for 10-14 days from symptom onset.     Please contact me for questions or concerns.      Sincerely,        Dee Middleton PA-C

## 2021-12-08 NOTE — NURSING NOTE
"Chief Complaint   Patient presents with     Headache     Nausea     Patient is here for a headache and nausea that started Monday. Patient would like a COVID test.     Initial Pulse 74   Temp 97.9  F (36.6  C) (Tympanic)   Resp 18   Ht 1.702 m (5' 7\")   Wt 125 kg (275 lb 9.6 oz)   SpO2 98%   BMI 43.17 kg/m   Estimated body mass index is 43.17 kg/m  as calculated from the following:    Height as of this encounter: 1.702 m (5' 7\").    Weight as of this encounter: 125 kg (275 lb 9.6 oz).  Medication Reconciliation: complete    Angella Cotto LPN  "

## 2021-12-08 NOTE — PROGRESS NOTES
ASSESSMENT/PLAN:    I have reviewed the nursing notes.  I have reviewed the findings, diagnosis, plan and need for follow up with the patient.    1. Acute nonintractable headache, unspecified headache type  2. Suspected 2019 novel coronavirus infection  - Symptomatic COVID-19 Virus (Coronavirus) by PCR Nose  - Vital signs stable. PE consistent with URI. COVID test was performed, recommend that patient remain in quarantine until results return, which typically takes 24-72 hours. If COVID testing is negative, symptoms are improving (no need for antipyretics, etc.), that patient can return to normal ADLs, if COVID test returns positive, recommend quarantine for 10-14 days from symptom onset. Recommend: increased fluids, rest, use of humidifier, antitussives (cough medication for adults), alternating tylenol and ibuprofen every 4-6 hours as needed (if able to take these medications), salt water gargles for sore throats or throat lozenges, honey, netti pots/saline rinses for sinus/congestion, as well as other home remedies.     If worsening fevers, chills, uncontrollable nausea/vomiting, dehydration,etc., or other worsening signs occur, patient is agreeable to follow up for reevaluation.     Patient is in agreement and understanding of the above treatment plan. All questions and concerns were addressed and answered to patient's satisfaction. AVS reviewed with patient.     Discussed warning signs/symptoms indicative of need to f/u    Follow up if symptoms persist or worsen or concerns    I explained my diagnostic considerations and recommendations to the patient, who voiced understanding and agreement with the treatment plan. All questions were answered. We discussed potential side effects of any prescribed or recommended therapies, as well as expectations for response to treatments.    Dee Middleton PA-C  12/8/2021  4:11 PM    HPI:    Kelsie Danielle is a 21 year old female  who presents to [facility] today for  concerns of URI, x 2 days    Symptoms:  Yes fevers or chills. Fever, highest reported temperature: 101 F on Monday  No sore throat/pharyngitis/tonsillitis.   No allergy/URI Symptoms  Yes Muffled Sounds/Change in Hearing  Yes Sensation of Fullness in Ear(s)  No Ringing in Ears/Tinnitus  No Balance Changes  No Dizziness  No Congestion (head/nasal/chest)  No Cough/Productive Cough  No Post Nasal Drip  Yes Headache  No Sinus Pain/Pressure  Yes Myalgias  No Otalgia  Activity Level Changes: Yes: fatigued  Appetite/Liquid Intake Changes: No  Changes to Bowel Habits: No  Changes to Bladder Habits: No  Additional Symptoms to Report: No  History of similar symptoms: No  Prior workup: No    Treatments tried: Tylenol/Ibuprofen, Fluids and Rest    Site of exposure: nephews  Type of exposure: colds and flu    Cardiopulmonary History:  Recent Infections (Pneumonia, etc): No  Smoker: No  Asthma: No  COPD: No  Additional History: No  Cystic Fibrosis: No  Cardiac History Including Stroke/Stent Placement/STEMI/STEMI, etc.: No    Allergies: Amoxil    PCP: DO Ant    Past Medical History:   Diagnosis Date     Acute suppurative otitis media without spontaneous rupture of tympanic membrane     2003,Left     Fever     2008,Lyme and Anaplasma titers pending.     History reviewed. No pertinent surgical history.  Social History     Tobacco Use     Smoking status: Never Smoker     Smokeless tobacco: Never Used   Substance Use Topics     Alcohol use: Yes     Comment: occasionally     Current Outpatient Medications   Medication Sig Dispense Refill     ALPRAZolam (XANAX) 0.5 MG tablet Take 1 tablet (0.5 mg) by mouth daily as needed for anxiety 30 tablet 1     Allergies   Allergen Reactions     Amoxicillin Rash     Past medical history, past surgical history, current medications and allergies reviewed and accurate to the best of my knowledge.      ROS:  Refer to HPI    /80   Pulse 74   Temp 97.9  F (36.6  C) (Tympanic)   Resp 18   Ht  "1.702 m (5' 7\")   Wt 125 kg (275 lb 9.6 oz)   SpO2 98%   BMI 43.17 kg/m      EXAM:  General Appearance: Well appearing 21 year old female, appropriate appearance for age. No acute distress   Ears: Left TM intact, translucent with bony landmarks appreciated, no erythema, no effusion, no bulging, no purulence.  Right TM intact, translucent with bony landmarks appreciated, no erythema, no effusion, no bulging, no purulence.  Left auditory canal clear.  Right auditory canal clear.  Normal external ears, non tender.  Eyes: conjunctivae normal without erythema or irritation, corneas clear, no drainage or crusting, no eyelid swelling, pupils equal   Orophayrnx: moist mucous membranes, posterior pharynx without erythema, tonsils 2+, no erythema, no exudates or petechiae, no post nasal drip seen, no trismus, voice clear.    Sinuses:  No sinus tenderness upon palpation of the frontal or maxillary sinuses  Nose:  Bilateral nares: no erythema, no edema, no drainage or congestion   Neck: supple without adenopathy  Respiratory: normal chest wall and respirations.  Normal effort.  Clear to auscultation bilaterally, no wheezing, crackles or rhonchi.  No increased work of breathing.  No cough appreciated.  Cardiac: RRR with no murmurs  Dermatological: no rashes noted of exposed skin  Psychological: normal affect, alert, oriented, and pleasant.     Labs:  COVID in process    Xray:  None     "

## 2021-12-09 LAB — SARS-COV-2 RNA RESP QL NAA+PROBE: NEGATIVE

## 2021-12-29 ENCOUNTER — OFFICE VISIT (OUTPATIENT)
Dept: FAMILY MEDICINE | Facility: OTHER | Age: 21
End: 2021-12-29
Attending: NURSE PRACTITIONER
Payer: COMMERCIAL

## 2021-12-29 VITALS
SYSTOLIC BLOOD PRESSURE: 120 MMHG | RESPIRATION RATE: 18 BRPM | BODY MASS INDEX: 44.64 KG/M2 | WEIGHT: 285 LBS | OXYGEN SATURATION: 96 % | DIASTOLIC BLOOD PRESSURE: 82 MMHG | TEMPERATURE: 98.8 F | HEART RATE: 96 BPM

## 2021-12-29 DIAGNOSIS — J10.1 INFLUENZA A: ICD-10-CM

## 2021-12-29 DIAGNOSIS — R05.9 COUGH: ICD-10-CM

## 2021-12-29 DIAGNOSIS — R68.83 CHILLS (WITHOUT FEVER): Primary | ICD-10-CM

## 2021-12-29 PROBLEM — E66.01 MORBID OBESITY (H): Status: ACTIVE | Noted: 2021-12-29

## 2021-12-29 LAB
FLUAV RNA SPEC QL NAA+PROBE: POSITIVE
FLUBV RNA RESP QL NAA+PROBE: NEGATIVE
RSV RNA SPEC NAA+PROBE: NEGATIVE
SARS-COV-2 RNA RESP QL NAA+PROBE: NEGATIVE

## 2021-12-29 PROCEDURE — 87637 SARSCOV2&INF A&B&RSV AMP PRB: CPT | Mod: ZL | Performed by: NURSE PRACTITIONER

## 2021-12-29 PROCEDURE — C9803 HOPD COVID-19 SPEC COLLECT: HCPCS | Performed by: NURSE PRACTITIONER

## 2021-12-29 PROCEDURE — 99213 OFFICE O/P EST LOW 20 MIN: CPT | Performed by: NURSE PRACTITIONER

## 2021-12-29 ASSESSMENT — PAIN SCALES - GENERAL: PAINLEVEL: MILD PAIN (3)

## 2021-12-29 NOTE — PROGRESS NOTES
ASSESSMENT/PLAN:    I have reviewed the nursing notes.  I have reviewed the findings, diagnosis, plan and need for follow up with the patient.    1. Chills (without fever)  2. Cough  - Symptomatic; Yes; 12/28/2021 Influenza A/B & SARS-CoV2 (COVID-19) Virus PCR Multiplex Nose    3. 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  She does positive for influenza A which is consistent with physical exam findings which were benign and symptoms.  She declines interest in Tamiflu.  Recommend symptomatic treatment at home and provided reassurance that she is stable upon exam and vitals today  -I recommended that she stay home from work for 3 to 5 days which is time.  That she is most contagious but encourage her to reach out to her employer for recommendation on when to return to work.  Symptomatic treatment - Encouraged fluids, salt water gargles, honey (only if greater than 1 year in age due to risk of botulism), elevation, humidifier, sinus rinse/netti pot, lozenges, tea, topical vapor rub, popsicles, rest, etc   -May use over-the-counter Tylenol or ibuprofen PRN    Discussed warning signs/symptoms indicative of need to f/u    Follow up if symptoms persist or worsen or concerns    I explained my diagnostic considerations and recommendations to the patient, who voiced understanding and agreement with the treatment plan. All questions were answered. We discussed potential side effects of any prescribed or recommended therapies, as well as expectations for response to treatments.    Emily De Los Santos NP  12/29/2021  10:17 AM    HPI:  Kelsie Danielle is a 21 year old female who presents to [facility] today for concerns of cough, sore throat, body aches, fatigue, sinus drainage, chills, and plugged ears that started yesterday evening. She works at an assisted living facility locally. Started with the fatigue. No shortness of breath or chest pain. No nausea,  vomiting, diarrhea. Owen is sick with similar symptoms; she was exposed to sick children over marcella as well. Did rapid covid tests that were negative; is vaccinated for covid. No one at the assisted living facility is currently sick nor are her co workers. She had a temp of 101 last evening; but no other known fevers. Did feel warm at that time.     Past Medical History:   Diagnosis Date     Acute suppurative otitis media without spontaneous rupture of tympanic membrane     2003,Left     Fever     2008,Lyme and Anaplasma titers pending.     History reviewed. No pertinent surgical history.  Social History     Tobacco Use     Smoking status: Never Smoker     Smokeless tobacco: Never Used   Substance Use Topics     Alcohol use: Yes     Comment: occasionally     Current Outpatient Medications   Medication Sig Dispense Refill     ALPRAZolam (XANAX) 0.5 MG tablet Take 1 tablet (0.5 mg) by mouth daily as needed for anxiety 30 tablet 1     Allergies   Allergen Reactions     Amoxicillin Rash     Past medical history, past surgical history, current medications and allergies reviewed and accurate to the best of my knowledge.      ROS:  Refer to HPI    /82   Pulse 96   Temp 98.8  F (37.1  C) (Tympanic)   Resp 18   Wt 129.3 kg (285 lb)   LMP  (LMP Unknown)   SpO2 96%   Breastfeeding No   BMI 44.64 kg/m      EXAM:  General Appearance: Well appearing 21 year old female, appropriate appearance for age. No acute distress   Ears: Left TM intact, translucent with bony landmarks appreciated, no erythema, no effusion, no bulging, no purulence.  Right TM intact, translucent with bony landmarks appreciated, no erythema, no effusion, no bulging, no purulence.  Left auditory canal clear.  Right auditory canal clear.  Normal external ears, non tender.  Eyes: conjunctivae normal without erythema or irritation, corneas clear, no drainage or crusting, no eyelid swelling, pupils equal   Orophayrnx: moist mucous membranes,  posterior pharynx without erythema, tonsils symmetric, no erythema, no exudates or petechiae, no post nasal drip seen, no trismus, voice clear.    Sinuses:  No sinus tenderness upon palpation of the frontal or maxillary sinuses  Nose:  Bilateral nares: no erythema, no edema, no drainage or congestion   Neck: supple without adenopathy  Respiratory: normal chest wall and respirations.  Normal effort.  Clear to auscultation bilaterally, no wheezing, crackles or rhonchi.  No increased work of breathing.  + nonproductive cough appreciated.  Cardiac: RRR with no murmurs  Psychological: normal affect, alert, oriented, and pleasant.     Results for orders placed or performed in visit on 12/29/21   Symptomatic; Yes; 12/28/2021 Influenza A/B & SARS-CoV2 (COVID-19) Virus PCR Multiplex Nose     Status: Abnormal    Specimen: Nose; Swab   Result Value Ref Range    Influenza A PCR Positive (A) Negative    Influenza B PCR Negative Negative    RSV PCR Negative Negative    SARS CoV2 PCR Negative Negative    Narrative    Testing was performed using the Xpert Xpress CoV2/Flu/RSV Assay on the [employer] GeneXpert Instrument. This test should be ordered for the detection of SARS-CoV-2 and influenza viruses in individuals who meet clinical and/or epidemiological criteria. Test performance is unknown in asymptomatic patients. This test is for in vitro diagnostic use under the FDA EUA for laboratories certified under CLIA to perform high or moderate complexity testing. This test has not been FDA cleared or approved. A negative result does not rule out the presence of PCR inhibitors in the specimen or target RNA in concentration below the limit of detection for the assay. If only one viral target is positive but coinfection with multiple targets is suspected, the sample should be re-tested with another FDA cleared, approved, or authorized test, if coinfection would change clinical management. This test was validated by the [facility]  [facility]. These laboratories are certified under the Clinical  Laboratory Improvement Amendments of 1988 (CLIA-88) as qualified to perform high complexity laboratory testing.

## 2021-12-29 NOTE — NURSING NOTE
Patient presenting with  A cough, sore throat, body aches, fatigue, sinus drainage, chills, and, plugged ears.  Medication Reconciliation: complete  Advanced Care Directive Reviewed.    Nahomy Danielle LPN  12/29/2021 10:16 AM

## 2021-12-29 NOTE — LETTER
December 29, 2021                                                                     To Whom it May Concern:    Kelsie Danielle attended clinic here on Dec 29, 2021. She has pending test results; may return to work if all are negative and she is afebrile for 24 hours without fever reducing medication.      Sincerely,    Emily De Los Santos NP

## 2021-12-29 NOTE — PATIENT INSTRUCTIONS
Symptomatic treatment - Encouraged fluids, salt water gargles, honey (only if greater than 1 year in age due to risk of botulism), elevation, humidifier, sinus rinse/netti pot, lozenges, tea, topical vapor rub, popsicles, rest, etc.     May use over-the-counter Tylenol or ibuprofen PRN    Antibiotics are not indicated.     Multiplex test pending for influenza a, b, rsv, covid 19

## 2022-01-29 ENCOUNTER — HEALTH MAINTENANCE LETTER (OUTPATIENT)
Age: 22
End: 2022-01-29

## 2022-03-17 ENCOUNTER — MYC MEDICAL ADVICE (OUTPATIENT)
Dept: FAMILY MEDICINE | Facility: OTHER | Age: 22
End: 2022-03-17

## 2022-03-17 DIAGNOSIS — F41.8 SITUATIONAL ANXIETY: ICD-10-CM

## 2022-03-17 RX ORDER — ALPRAZOLAM 0.5 MG
0.5 TABLET ORAL DAILY PRN
Qty: 30 TABLET | Refills: 1 | Status: SHIPPED | OUTPATIENT
Start: 2022-03-17 | End: 2022-12-28

## 2022-09-17 ENCOUNTER — HEALTH MAINTENANCE LETTER (OUTPATIENT)
Age: 22
End: 2022-09-17

## 2022-12-23 ENCOUNTER — TELEPHONE (OUTPATIENT)
Dept: FAMILY MEDICINE | Facility: OTHER | Age: 22
End: 2022-12-23

## 2022-12-23 NOTE — TELEPHONE ENCOUNTER
Patient request to have SMS be her PCP. Patient stated she has seen SMS in the past and only wants to see SMS in the future.    Christina Mendez on 12/23/2022 at 11:39 AM

## 2022-12-23 NOTE — TELEPHONE ENCOUNTER
Yes; she is my patient. I updated PCP on her chart.  Jessica Cain, DO on 12/23/2022 at 12:04 PM

## 2022-12-27 NOTE — PROGRESS NOTES
Assessment & Plan   1. Menorrhagia with regular cycle  New, first cycle occurrence since having had Nexplanon out last year.  Will obtain labs as below.   Discussed monitoring next 2 menses; and consider IUD placement with OB/GYN as she is anxious and may require sedation.  - CBC W PLT No Diff; Future  - TSH; Future  - HCG quantitative pregnancy; Future  - T4, Free; Future  - Wet Prep, Genital  - HCG quantitative pregnancy  - T4, Free  - TSH  - CBC W PLT No Diff    2. Dysmenorrhea  As above; will provide zofran to help with nausea 2/2 to pain; continue scheduled NSAIDs.  - ondansetron (ZOFRAN ODT) 4 MG ODT tab; Take 1 tablet (4 mg) by mouth every 8 hours as needed for nausea  Dispense: 20 tablet; Refill: 2    3. Cervical cancer screening  Collected today during exam.  - Pap Screen Thin Prep    4. Encounter for screening examination for chlamydial infection  Collected today during exam; low risk.  - GC/Chlamydia by PCR    5. Situational anxiety  Chronic, waxing and waning.  Rx trial of hydroxyzine vs xanax.  OK to try at different times to see what helps her symptoms with the least amount of side effects.  - ALPRAZolam (XANAX) 1 MG tablet; Take 1 tablet (1 mg) by mouth daily as needed for anxiety  Dispense: 30 tablet; Refill: 2  - hydrOXYzine (VISTARIL) 50 MG capsule; Take 1 capsule (50 mg) by mouth 2 times daily as needed for itching  Dispense: 60 capsule; Refill: 2    6. FELIZ (generalized anxiety disorder)  Chronic, worsening.  I've explained to her that drugs of the SSRI class can have side effects such as weight gain, sexual dysfunction, insomnia, headache, nausea. These medications are generally effective at alleviating symptoms of anxiety and/or depression. Let me know if significant side effects do occur. Start lexapro 5mg daily.  - escitalopram (LEXAPRO) 5 MG tablet; Take 1 tablet (5 mg) by mouth daily  Dispense: 30 tablet; Refill: 5  - hydrOXYzine (VISTARIL) 50 MG capsule; Take 1 capsule (50 mg) by mouth 2  "times daily as needed for itching  Dispense: 60 capsule; Refill: 2    7. Screening for HIV without presence of risk factors  Collected screening with today's blood work.  - HIV Antigen Antibody Combo; Future  - HIV Antigen Antibody Combo    8. Encounter for hepatitis C screening test for low risk patient  Collected screening with today's blood work.  - Hepatitis C Screen Reflex to HCV RNA Quant and Genotype; Future  - Hepatitis C Screen Reflex to HCV RNA Quant and Genotype    9. Lipid screening  Collected screening with today's blood work.  - Lipid Panel; Future  - Lipid Panel    10. Diabetes mellitus screening  Collected screening with today's blood work.  - Hemoglobin A1c; Future  - Hemoglobin A1c    11. Bacterial vaginosis  As seen on wet prep, which was collected during exam today.  Rx for metronidazole sent to pharmacy x 7 days of treatment, could also have played a role in pain/bleeding amount.  And encouraged to monitor.  - metroNIDAZOLE (FLAGYL) 500 MG tablet; Take 1 tablet (500 mg) by mouth 2 times daily for 7 days  Dispense: 14 tablet; Refill: 0    MDM:  Ordering of each unique test  Prescription drug management      BMI:   Estimated body mass index is 43.53 kg/m  as calculated from the following:    Height as of this encounter: 1.708 m (5' 7.25\").    Weight as of this encounter: 127 kg (280 lb).   Weight management plan: Discussed healthy diet and exercise guidelines    Depression Screening Follow Up  PHQ 12/28/2022   PHQ-9 Total Score 10   Q9: Thoughts of better off dead/self-harm past 2 weeks Not at all     Follow Up Actions Taken  See plan; started Lexapro and will follow up     Briefly discussed likely PCOS due to oligomenorrhea, acanthosis nigricans, obesity.  Will monitor lab pattern for IR, hyperlipidemia as well.   Will benefit from further discussion at a future visit along with metformin therapy and counseling on diet/exercise.    Jessica Cain,   [facility]      Subjective   Kelsie is a 22 year old, presenting for the following health issues:  Abnormal Bleeding Problem      History of Present Illness       Reason for visit:  Anxiety, severe period symptoms    She eats 0-1 servings of fruits and vegetables daily.She consumes 2 sweetened beverage(s) daily.She exercises with enough effort to increase her heart rate 10 to 19 minutes per day.  She exercises with enough effort to increase her heart rate 4 days per week.   She is taking medications regularly.    Today's PHQ-9         PHQ-9 Total Score: 10    PHQ-9 Q9 Thoughts of better off dead/self-harm past 2 weeks :   Not at all    How difficult have these problems made it for you to do your work, take care of things at home, or get along with other people: Very difficult  Today's FELIZ-7 Score: 18     Had Nexplanon since ~2015, taken out on 5/12/2021 and was planning to use OCPs.  At that visit was given Junel Fe; but is not currently taking any OCPs.    Kelsie recently had her first menses since taking out her Nexplanon; last month.  It was painful, had large clots, and significant bleeding.  Had to miss a couple days of work.  The severity of pain caused her to have nausea.  She is here primarily in hopes to avoid another menses like that again.  She is adamant that she does not ever want to have kids.  Her ideal would be to have a hysterectomy, but aware that is not likely an option at this time.  Prior to Nexplanon had irregular menses.      She is also noticing worsening or a constant to her anxiety.  Has worked with therapy in the past and ready to try something daily.  Does have prn xanax but doesn't like to rely on that much.     FELIZ-7 SCORE 12/28/2022   Total Score 18 (severe anxiety)   Total Score 18       PHQ 3/14/2016 1/12/2021 12/28/2022   PHQ-9 Total Score 17 13 10   Q9: Thoughts of better off dead/self-harm past 2 weeks Several days Not at all Not at all           Objective    /88   Pulse 117    "Temp 98.2  F (36.8  C) (Tympanic)   Resp 16   Ht 1.708 m (5' 7.25\")   Wt 127 kg (280 lb)   LMP 12/17/2022 (Exact Date)   SpO2 98%   BMI 43.53 kg/m    Body mass index is 43.53 kg/m .  Physical Exam   GENERAL: healthy, alert and no distress  EYES: Eyes grossly normal to inspection, PERRL and conjunctivae and sclerae normal  NECK: no adenopathy, no asymmetry, masses, or scars and thyroid normal to palpation  RESP: lungs clear to auscultation - no rales, rhonchi or wheezes  CV: regular rate and rhythm, normal S1 S2, no S3 or S4, no murmur, click or rub, no peripheral edema and peripheral pulses strong  ABDOMEN: soft, nontender, no hepatosplenomegaly, no masses and bowel sounds normal   (female): normal female external genitalia, normal urethral meatus, vaginal mucosa, normal cervix/adnexa/uterus without masses or discharge  MS: no gross musculoskeletal defects noted, no edema  SKIN: no suspicious lesions or rashes  PSYCH: mentation appears normal, affect normal/bright    Results for orders placed or performed in visit on 12/28/22   Lipid Panel     Status: Abnormal   Result Value Ref Range    Cholesterol 170 <200 mg/dL    Triglycerides 175 (H) <150 mg/dL    Direct Measure HDL 29 (L) >=50 mg/dL    LDL Cholesterol Calculated 106 (H) <=100 mg/dL    Non HDL Cholesterol 141 (H) <130 mg/dL    Narrative    Cholesterol  Desirable:  <200 mg/dL    Triglycerides  Normal:  Less than 150 mg/dL  Borderline High:  150-199 mg/dL  High:  200-499 mg/dL  Very High:  Greater than or equal to 500 mg/dL    Direct Measure HDL  Female:  Greater than or equal to 50 mg/dL   Male:  Greater than or equal to 40 mg/dL    LDL Cholesterol  Desirable:  <100mg/dL  Above Desirable:  100-129 mg/dL   Borderline High:  130-159 mg/dL   High:  160-189 mg/dL   Very High:  >= 190 mg/dL    Non HDL Cholesterol  Desirable:  130 mg/dL  Above Desirable:  130-159 mg/dL  Borderline High:  160-189 mg/dL  High:  190-219 mg/dL  Very High:  Greater than or equal to " 220 mg/dL   Hemoglobin A1c     Status: Normal   Result Value Ref Range    Hemoglobin A1C 5.3 4.0 - 6.2 %   Hepatitis C Screen Reflex to HCV RNA Quant and Genotype     Status: Normal   Result Value Ref Range    Hepatitis C Antibody Nonreactive Nonreactive    Narrative    Assay performance characteristics have not been established for newborns, infants, and children.   HIV Antigen Antibody Combo     Status: Normal   Result Value Ref Range    HIV Antigen Antibody Combo Nonreactive Nonreactive   HCG quantitative pregnancy     Status: Normal   Result Value Ref Range    hCG Quantitative <1 <5 mIU/mL   T4, Free     Status: Normal   Result Value Ref Range    Free T4 1.42 0.90 - 1.70 ng/dL   TSH     Status: Normal   Result Value Ref Range    TSH 2.91 0.30 - 4.20 uIU/mL   CBC W PLT No Diff     Status: Normal   Result Value Ref Range    WBC Count 7.1 4.0 - 11.0 10e3/uL    RBC Count 4.87 3.80 - 5.20 10e6/uL    Hemoglobin 13.5 11.7 - 15.7 g/dL    Hematocrit 40.2 35.0 - 47.0 %    MCV 83 78 - 100 fL    MCH 27.7 26.5 - 33.0 pg    MCHC 33.6 31.5 - 36.5 g/dL    RDW 11.5 10.0 - 15.0 %    Platelet Count 414 150 - 450 10e3/uL   Pap Screen Thin Prep     Status: None   Result Value Ref Range    Interpretation        Negative for Intraepithelial Lesion or Malignancy (NILM)    Comment         Papanicolaou Test Limitations:  Cervical cytology is a screening test with limited sensitivity, and regular screening is critical for cancer prevention.  Pap tests are primarily effective for the diagnosis/prevention of squamous cell carcinoma, not adenocarcinoma or other cancers.        Specimen Adequacy       Satisfactory for evaluation, endocervical/transformation zone component present    Clinical Information       none      Reflex Testing Yes if ASCUS     Previous Abnormal?       No      Performing Labs       The technical component of this testing was completed at [facility]     GC/Chlamydia by PCR     Status: Normal     Specimen: Endocervix; Swab   Result Value Ref Range    Chlamydia Trachomatis Negative Negative    Neisseria gonorrhoeae Negative Negative    Narrative    Assay performed using [employer] real-time, reverse-transcriptase PCR.   Wet Prep, Genital     Status: Abnormal    Specimen: Vagina; Swab   Result Value Ref Range    Trichomonas Absent Absent    Yeast Absent Absent    Clue Cells Present (A) Absent    WBCs/high power field 3+ (A) None

## 2022-12-28 ENCOUNTER — OFFICE VISIT (OUTPATIENT)
Dept: FAMILY MEDICINE | Facility: OTHER | Age: 22
End: 2022-12-28
Attending: FAMILY MEDICINE
Payer: COMMERCIAL

## 2022-12-28 VITALS
WEIGHT: 280 LBS | BODY MASS INDEX: 43.95 KG/M2 | HEART RATE: 117 BPM | RESPIRATION RATE: 16 BRPM | HEIGHT: 67 IN | SYSTOLIC BLOOD PRESSURE: 130 MMHG | OXYGEN SATURATION: 98 % | TEMPERATURE: 98.2 F | DIASTOLIC BLOOD PRESSURE: 88 MMHG

## 2022-12-28 DIAGNOSIS — B96.89 BACTERIAL VAGINOSIS: ICD-10-CM

## 2022-12-28 DIAGNOSIS — Z13.1 DIABETES MELLITUS SCREENING: ICD-10-CM

## 2022-12-28 DIAGNOSIS — Z11.8 ENCOUNTER FOR SCREENING EXAMINATION FOR CHLAMYDIAL INFECTION: ICD-10-CM

## 2022-12-28 DIAGNOSIS — Z11.59 ENCOUNTER FOR HEPATITIS C SCREENING TEST FOR LOW RISK PATIENT: ICD-10-CM

## 2022-12-28 DIAGNOSIS — N76.0 BACTERIAL VAGINOSIS: ICD-10-CM

## 2022-12-28 DIAGNOSIS — F41.1 GAD (GENERALIZED ANXIETY DISORDER): ICD-10-CM

## 2022-12-28 DIAGNOSIS — Z13.220 LIPID SCREENING: ICD-10-CM

## 2022-12-28 DIAGNOSIS — N94.6 DYSMENORRHEA: ICD-10-CM

## 2022-12-28 DIAGNOSIS — F41.8 SITUATIONAL ANXIETY: ICD-10-CM

## 2022-12-28 DIAGNOSIS — Z12.4 CERVICAL CANCER SCREENING: ICD-10-CM

## 2022-12-28 DIAGNOSIS — N92.0 MENORRHAGIA WITH REGULAR CYCLE: Primary | ICD-10-CM

## 2022-12-28 DIAGNOSIS — Z11.4 SCREENING FOR HIV WITHOUT PRESENCE OF RISK FACTORS: ICD-10-CM

## 2022-12-28 LAB
C TRACH DNA SPEC QL PROBE+SIG AMP: NEGATIVE
CHOLEST SERPL-MCNC: 170 MG/DL
CLUE CELLS: PRESENT
ERYTHROCYTE [DISTWIDTH] IN BLOOD BY AUTOMATED COUNT: 11.5 % (ref 10–15)
HBA1C MFR BLD: 5.3 % (ref 4–6.2)
HCG INTACT+B SERPL-ACNC: <1 MIU/ML
HCT VFR BLD AUTO: 40.2 % (ref 35–47)
HDLC SERPL-MCNC: 29 MG/DL
HGB BLD-MCNC: 13.5 G/DL (ref 11.7–15.7)
LDLC SERPL CALC-MCNC: 106 MG/DL
MCH RBC QN AUTO: 27.7 PG (ref 26.5–33)
MCHC RBC AUTO-ENTMCNC: 33.6 G/DL (ref 31.5–36.5)
MCV RBC AUTO: 83 FL (ref 78–100)
N GONORRHOEA DNA SPEC QL NAA+PROBE: NEGATIVE
NONHDLC SERPL-MCNC: 141 MG/DL
PLATELET # BLD AUTO: 414 10E3/UL (ref 150–450)
RBC # BLD AUTO: 4.87 10E6/UL (ref 3.8–5.2)
T4 FREE SERPL-MCNC: 1.42 NG/DL (ref 0.9–1.7)
TRICHOMONAS, WET PREP: ABNORMAL
TRIGL SERPL-MCNC: 175 MG/DL
TSH SERPL DL<=0.005 MIU/L-ACNC: 2.91 UIU/ML (ref 0.3–4.2)
WBC # BLD AUTO: 7.1 10E3/UL (ref 4–11)
WBC'S/HIGH POWER FIELD, WET PREP: ABNORMAL
YEAST, WET PREP: ABNORMAL

## 2022-12-28 PROCEDURE — 85027 COMPLETE CBC AUTOMATED: CPT | Mod: ZL | Performed by: FAMILY MEDICINE

## 2022-12-28 PROCEDURE — 86803 HEPATITIS C AB TEST: CPT | Mod: ZL | Performed by: FAMILY MEDICINE

## 2022-12-28 PROCEDURE — 87389 HIV-1 AG W/HIV-1&-2 AB AG IA: CPT | Mod: ZL | Performed by: FAMILY MEDICINE

## 2022-12-28 PROCEDURE — 99214 OFFICE O/P EST MOD 30 MIN: CPT | Performed by: FAMILY MEDICINE

## 2022-12-28 PROCEDURE — 80061 LIPID PANEL: CPT | Mod: ZL | Performed by: FAMILY MEDICINE

## 2022-12-28 PROCEDURE — 84439 ASSAY OF FREE THYROXINE: CPT | Mod: ZL | Performed by: FAMILY MEDICINE

## 2022-12-28 PROCEDURE — 87210 SMEAR WET MOUNT SALINE/INK: CPT | Mod: ZL | Performed by: FAMILY MEDICINE

## 2022-12-28 PROCEDURE — 83036 HEMOGLOBIN GLYCOSYLATED A1C: CPT | Mod: ZL | Performed by: FAMILY MEDICINE

## 2022-12-28 PROCEDURE — 87491 CHLMYD TRACH DNA AMP PROBE: CPT | Mod: ZL | Performed by: FAMILY MEDICINE

## 2022-12-28 PROCEDURE — 84443 ASSAY THYROID STIM HORMONE: CPT | Mod: ZL | Performed by: FAMILY MEDICINE

## 2022-12-28 PROCEDURE — G0123 SCREEN CERV/VAG THIN LAYER: HCPCS | Performed by: FAMILY MEDICINE

## 2022-12-28 PROCEDURE — 36415 COLL VENOUS BLD VENIPUNCTURE: CPT | Mod: ZL | Performed by: FAMILY MEDICINE

## 2022-12-28 PROCEDURE — 84702 CHORIONIC GONADOTROPIN TEST: CPT | Mod: ZL | Performed by: FAMILY MEDICINE

## 2022-12-28 PROCEDURE — 87591 N.GONORRHOEAE DNA AMP PROB: CPT | Mod: ZL | Performed by: FAMILY MEDICINE

## 2022-12-28 RX ORDER — ALPRAZOLAM 1 MG
1 TABLET ORAL DAILY PRN
Qty: 30 TABLET | Refills: 2 | Status: SHIPPED | OUTPATIENT
Start: 2022-12-28

## 2022-12-28 RX ORDER — ESCITALOPRAM OXALATE 5 MG/1
5 TABLET ORAL DAILY
Qty: 30 TABLET | Refills: 5 | Status: SHIPPED | OUTPATIENT
Start: 2022-12-28

## 2022-12-28 RX ORDER — ONDANSETRON 4 MG/1
4 TABLET, ORALLY DISINTEGRATING ORAL EVERY 8 HOURS PRN
Qty: 20 TABLET | Refills: 2 | Status: SHIPPED | OUTPATIENT
Start: 2022-12-28 | End: 2023-12-19

## 2022-12-28 RX ORDER — METRONIDAZOLE 500 MG/1
500 TABLET ORAL 2 TIMES DAILY
Qty: 14 TABLET | Refills: 0 | Status: SHIPPED | OUTPATIENT
Start: 2022-12-28 | End: 2023-01-04

## 2022-12-28 RX ORDER — HYDROXYZINE PAMOATE 50 MG/1
50 CAPSULE ORAL 2 TIMES DAILY PRN
Qty: 60 CAPSULE | Refills: 2 | Status: SHIPPED | OUTPATIENT
Start: 2022-12-28

## 2022-12-28 ASSESSMENT — ANXIETY QUESTIONNAIRES
3. WORRYING TOO MUCH ABOUT DIFFERENT THINGS: NEARLY EVERY DAY
6. BECOMING EASILY ANNOYED OR IRRITABLE: NEARLY EVERY DAY
GAD7 TOTAL SCORE: 18
8. IF YOU CHECKED OFF ANY PROBLEMS, HOW DIFFICULT HAVE THESE MADE IT FOR YOU TO DO YOUR WORK, TAKE CARE OF THINGS AT HOME, OR GET ALONG WITH OTHER PEOPLE?: EXTREMELY DIFFICULT
1. FEELING NERVOUS, ANXIOUS, OR ON EDGE: NEARLY EVERY DAY
2. NOT BEING ABLE TO STOP OR CONTROL WORRYING: NEARLY EVERY DAY
7. FEELING AFRAID AS IF SOMETHING AWFUL MIGHT HAPPEN: NOT AT ALL
IF YOU CHECKED OFF ANY PROBLEMS ON THIS QUESTIONNAIRE, HOW DIFFICULT HAVE THESE PROBLEMS MADE IT FOR YOU TO DO YOUR WORK, TAKE CARE OF THINGS AT HOME, OR GET ALONG WITH OTHER PEOPLE: EXTREMELY DIFFICULT
5. BEING SO RESTLESS THAT IT IS HARD TO SIT STILL: NEARLY EVERY DAY
GAD7 TOTAL SCORE: 18
7. FEELING AFRAID AS IF SOMETHING AWFUL MIGHT HAPPEN: NOT AT ALL
4. TROUBLE RELAXING: NEARLY EVERY DAY
GAD7 TOTAL SCORE: 18

## 2022-12-28 ASSESSMENT — PATIENT HEALTH QUESTIONNAIRE - PHQ9
SUM OF ALL RESPONSES TO PHQ QUESTIONS 1-9: 10
10. IF YOU CHECKED OFF ANY PROBLEMS, HOW DIFFICULT HAVE THESE PROBLEMS MADE IT FOR YOU TO DO YOUR WORK, TAKE CARE OF THINGS AT HOME, OR GET ALONG WITH OTHER PEOPLE: VERY DIFFICULT
SUM OF ALL RESPONSES TO PHQ QUESTIONS 1-9: 10

## 2022-12-28 ASSESSMENT — PAIN SCALES - GENERAL: PAINLEVEL: MILD PAIN (3)

## 2022-12-28 NOTE — NURSING NOTE
"Chief Complaint   Patient presents with     Abnormal Bleeding Problem       Initial /88   Pulse 117   Temp 98.2  F (36.8  C) (Tympanic)   Resp 16   Ht 1.708 m (5' 7.25\")   Wt 127 kg (280 lb)   LMP 12/17/2022 (Exact Date)   SpO2 98%   BMI 43.53 kg/m   Estimated body mass index is 43.53 kg/m  as calculated from the following:    Height as of this encounter: 1.708 m (5' 7.25\").    Weight as of this encounter: 127 kg (280 lb).  Medication Reconciliation: complete    Marlen Taylor LPN     Advanced Care Directive reviewed.     "

## 2022-12-29 LAB
HCV AB SERPL QL IA: NONREACTIVE
HIV 1+2 AB+HIV1 P24 AG SERPL QL IA: NONREACTIVE

## 2022-12-30 LAB
BKR LAB AP GYN ADEQUACY: NORMAL
BKR LAB AP GYN INTERPRETATION: NORMAL
BKR LAB AP HPV REFLEX: NORMAL
BKR LAB AP PREVIOUS ABNORMAL: NORMAL
PATH REPORT.COMMENTS IMP SPEC: NORMAL
PATH REPORT.COMMENTS IMP SPEC: NORMAL
PATH REPORT.RELEVANT HX SPEC: NORMAL

## 2023-05-06 ENCOUNTER — HEALTH MAINTENANCE LETTER (OUTPATIENT)
Age: 23
End: 2023-05-06

## 2023-11-09 ENCOUNTER — APPOINTMENT (OUTPATIENT)
Dept: CT IMAGING | Facility: OTHER | Age: 23
End: 2023-11-09
Attending: FAMILY MEDICINE
Payer: COMMERCIAL

## 2023-11-09 ENCOUNTER — HOSPITAL ENCOUNTER (EMERGENCY)
Facility: OTHER | Age: 23
Discharge: HOME | End: 2023-11-09
Attending: FAMILY MEDICINE | Admitting: FAMILY MEDICINE
Payer: COMMERCIAL

## 2023-11-09 VITALS
RESPIRATION RATE: 16 BRPM | SYSTOLIC BLOOD PRESSURE: 132 MMHG | TEMPERATURE: 98.4 F | DIASTOLIC BLOOD PRESSURE: 78 MMHG | HEART RATE: 80 BPM | BODY MASS INDEX: 39.24 KG/M2 | OXYGEN SATURATION: 98 % | HEIGHT: 67 IN | WEIGHT: 250 LBS

## 2023-11-09 DIAGNOSIS — N20.1 URETEROLITHIASIS: ICD-10-CM

## 2023-11-09 LAB
ALBUMIN SERPL BCG-MCNC: 4.3 G/DL (ref 3.5–5.2)
ALBUMIN UR-MCNC: 10 MG/DL
ALP SERPL-CCNC: 83 U/L (ref 35–104)
ALT SERPL W P-5'-P-CCNC: 28 U/L (ref 0–50)
ANION GAP SERPL CALCULATED.3IONS-SCNC: 11 MMOL/L (ref 7–15)
APPEARANCE UR: CLEAR
AST SERPL W P-5'-P-CCNC: 22 U/L (ref 0–45)
BASOPHILS # BLD AUTO: 0.1 10E3/UL (ref 0–0.2)
BASOPHILS NFR BLD AUTO: 1 %
BILIRUB SERPL-MCNC: 0.5 MG/DL
BILIRUB UR QL STRIP: NEGATIVE
BUN SERPL-MCNC: 15.6 MG/DL (ref 6–20)
CALCIUM SERPL-MCNC: 9.5 MG/DL (ref 8.6–10)
CHLORIDE SERPL-SCNC: 105 MMOL/L (ref 98–107)
COLOR UR AUTO: ABNORMAL
CREAT SERPL-MCNC: 0.79 MG/DL (ref 0.51–0.95)
DEPRECATED HCO3 PLAS-SCNC: 21 MMOL/L (ref 22–29)
EGFRCR SERPLBLD CKD-EPI 2021: >90 ML/MIN/1.73M2
EOSINOPHIL # BLD AUTO: 0.1 10E3/UL (ref 0–0.7)
EOSINOPHIL NFR BLD AUTO: 1 %
ERYTHROCYTE [DISTWIDTH] IN BLOOD BY AUTOMATED COUNT: 11.6 % (ref 10–15)
GLUCOSE SERPL-MCNC: 123 MG/DL (ref 70–99)
GLUCOSE UR STRIP-MCNC: NEGATIVE MG/DL
HCG UR QL: NEGATIVE
HCT VFR BLD AUTO: 39.9 % (ref 35–47)
HGB BLD-MCNC: 13.8 G/DL (ref 11.7–15.7)
HGB UR QL STRIP: ABNORMAL
HOLD SPECIMEN: NORMAL
IMM GRANULOCYTES # BLD: 0 10E3/UL
IMM GRANULOCYTES NFR BLD: 0 %
KETONES UR STRIP-MCNC: NEGATIVE MG/DL
LEUKOCYTE ESTERASE UR QL STRIP: NEGATIVE
LYMPHOCYTES # BLD AUTO: 2.6 10E3/UL (ref 0.8–5.3)
LYMPHOCYTES NFR BLD AUTO: 32 %
MCH RBC QN AUTO: 28.3 PG (ref 26.5–33)
MCHC RBC AUTO-ENTMCNC: 34.6 G/DL (ref 31.5–36.5)
MCV RBC AUTO: 82 FL (ref 78–100)
MONOCYTES # BLD AUTO: 0.4 10E3/UL (ref 0–1.3)
MONOCYTES NFR BLD AUTO: 5 %
MUCOUS THREADS #/AREA URNS LPF: PRESENT /LPF
NEUTROPHILS # BLD AUTO: 4.9 10E3/UL (ref 1.6–8.3)
NEUTROPHILS NFR BLD AUTO: 61 %
NITRATE UR QL: NEGATIVE
NRBC # BLD AUTO: 0 10E3/UL
NRBC BLD AUTO-RTO: 0 /100
PH UR STRIP: 5 [PH] (ref 5–9)
PLATELET # BLD AUTO: 384 10E3/UL (ref 150–450)
POTASSIUM SERPL-SCNC: 4 MMOL/L (ref 3.4–5.3)
PROT SERPL-MCNC: 7.3 G/DL (ref 6.4–8.3)
RBC # BLD AUTO: 4.87 10E6/UL (ref 3.8–5.2)
RBC URINE: 2 /HPF
SODIUM SERPL-SCNC: 137 MMOL/L (ref 135–145)
SP GR UR STRIP: 1.02 (ref 1–1.03)
SQUAMOUS EPITHELIAL: 2 /HPF
UROBILINOGEN UR STRIP-MCNC: NORMAL MG/DL
WBC # BLD AUTO: 8 10E3/UL (ref 4–11)
WBC URINE: 2 /HPF

## 2023-11-09 PROCEDURE — 99285 EMERGENCY DEPT VISIT HI MDM: CPT | Mod: 25 | Performed by: FAMILY MEDICINE

## 2023-11-09 PROCEDURE — 96374 THER/PROPH/DIAG INJ IV PUSH: CPT | Performed by: FAMILY MEDICINE

## 2023-11-09 PROCEDURE — 81001 URINALYSIS AUTO W/SCOPE: CPT | Performed by: FAMILY MEDICINE

## 2023-11-09 PROCEDURE — 258N000003 HC RX IP 258 OP 636: Performed by: FAMILY MEDICINE

## 2023-11-09 PROCEDURE — 96375 TX/PRO/DX INJ NEW DRUG ADDON: CPT | Performed by: FAMILY MEDICINE

## 2023-11-09 PROCEDURE — 81025 URINE PREGNANCY TEST: CPT | Performed by: FAMILY MEDICINE

## 2023-11-09 PROCEDURE — 85014 HEMATOCRIT: CPT | Performed by: FAMILY MEDICINE

## 2023-11-09 PROCEDURE — 36415 COLL VENOUS BLD VENIPUNCTURE: CPT | Performed by: FAMILY MEDICINE

## 2023-11-09 PROCEDURE — 99284 EMERGENCY DEPT VISIT MOD MDM: CPT | Performed by: FAMILY MEDICINE

## 2023-11-09 PROCEDURE — 250N000011 HC RX IP 250 OP 636: Mod: JZ | Performed by: FAMILY MEDICINE

## 2023-11-09 PROCEDURE — 74176 CT ABD & PELVIS W/O CONTRAST: CPT

## 2023-11-09 PROCEDURE — 80053 COMPREHEN METABOLIC PANEL: CPT | Performed by: FAMILY MEDICINE

## 2023-11-09 PROCEDURE — 96361 HYDRATE IV INFUSION ADD-ON: CPT | Performed by: FAMILY MEDICINE

## 2023-11-09 RX ORDER — HYDROMORPHONE HYDROCHLORIDE 1 MG/ML
0.5 INJECTION, SOLUTION INTRAMUSCULAR; INTRAVENOUS; SUBCUTANEOUS
Status: COMPLETED | OUTPATIENT
Start: 2023-11-09 | End: 2023-11-09

## 2023-11-09 RX ORDER — KETOROLAC TROMETHAMINE 10 MG/1
10 TABLET, FILM COATED ORAL EVERY 6 HOURS PRN
Qty: 20 TABLET | Refills: 0 | Status: SHIPPED | OUTPATIENT
Start: 2023-11-09

## 2023-11-09 RX ORDER — KETOROLAC TROMETHAMINE 15 MG/ML
15 INJECTION, SOLUTION INTRAMUSCULAR; INTRAVENOUS ONCE
Status: COMPLETED | OUTPATIENT
Start: 2023-11-09 | End: 2023-11-09

## 2023-11-09 RX ORDER — TAMSULOSIN HYDROCHLORIDE 0.4 MG/1
0.4 CAPSULE ORAL DAILY
Qty: 3 CAPSULE | Refills: 0 | Status: SHIPPED | OUTPATIENT
Start: 2023-11-09 | End: 2023-11-12

## 2023-11-09 RX ORDER — ONDANSETRON 2 MG/ML
4 INJECTION INTRAMUSCULAR; INTRAVENOUS EVERY 30 MIN PRN
Status: DISCONTINUED | OUTPATIENT
Start: 2023-11-09 | End: 2023-11-09 | Stop reason: HOSPADM

## 2023-11-09 RX ADMIN — HYDROMORPHONE HYDROCHLORIDE 0.5 MG: 1 INJECTION, SOLUTION INTRAMUSCULAR; INTRAVENOUS; SUBCUTANEOUS at 08:02

## 2023-11-09 RX ADMIN — KETOROLAC TROMETHAMINE 15 MG: 15 INJECTION, SOLUTION INTRAMUSCULAR; INTRAVENOUS at 08:01

## 2023-11-09 RX ADMIN — ONDANSETRON HYDROCHLORIDE 4 MG: 2 SOLUTION INTRAMUSCULAR; INTRAVENOUS at 08:00

## 2023-11-09 RX ADMIN — Medication 1000 ML: at 08:04

## 2023-11-09 ASSESSMENT — ENCOUNTER SYMPTOMS
CONSTIPATION: 0
FEVER: 0
ABDOMINAL PAIN: 1
VOMITING: 0
NAUSEA: 1
DYSURIA: 0
DIARRHEA: 1
HEMATURIA: 0
BACK PAIN: 1

## 2023-11-09 ASSESSMENT — ACTIVITIES OF DAILY LIVING (ADL)
ADLS_ACUITY_SCORE: 35
ADLS_ACUITY_SCORE: 37

## 2023-11-09 NOTE — ED PROVIDER NOTES
History     Chief Complaint   Patient presents with    Back Pain    Diarrhea    Urinary Frequency     HPI  Kelsie Danielle is a 23 year old female who presents with right flank pain.  It is radiating into her groin.  It started yesterday.  Is become severe 8 out of 10.  She has not yet taken anything over-the-counter for this.  She is never had pain like this in the past.  She denies chance of pregnancy though is sexually active with her partner and they are not 100% compliant with birth control.  She denies nausea or vomiting.  Kidney stones run in the family.  Pain is sharp and colicky from the right flank down into the right groin.  She denies dysuria or hematuria.    Allergies:  Allergies   Allergen Reactions    Amoxicillin Rash       Problem List:    Patient Active Problem List    Diagnosis Date Noted    Morbid obesity (H) 12/29/2021     Priority: Medium    Genital herpes simplex, unspecified site 11/27/2018     Priority: Medium    Contraception management 02/27/2014     Priority: Medium     Overview:   Started on Sprintec 2/27/58      Childhood obesity 08/05/2010     Priority: Medium        Past Medical History:    Past Medical History:   Diagnosis Date    Acute suppurative otitis media without spontaneous rupture of tympanic membrane     Fever        Past Surgical History:    No past surgical history on file.    Family History:    Family History   Problem Relation Age of Onset    Hypertension Mother         Hypertension    Heart Disease Maternal Grandmother         Heart Disease,Open heart surgery, ASD repair?    Heart Disease Paternal Grandfather 49        Heart Disease,CABG    Asthma Paternal Grandfather         Asthma    Other - See Comments Paternal Grandfather         Non-hodgkins lymphoma       Social History:  Marital Status:   [2]  Social History     Tobacco Use    Smoking status: Never    Smokeless tobacco: Never   Vaping Use    Vaping Use: Never used   Substance Use Topics    Alcohol use:  "Yes     Comment: occasionally    Drug use: No     Comment: Drug use: No        Medications:    ketorolac (TORADOL) 10 MG tablet  tamsulosin (FLOMAX) 0.4 MG capsule  ALPRAZolam (XANAX) 1 MG tablet  escitalopram (LEXAPRO) 5 MG tablet  hydrOXYzine (VISTARIL) 50 MG capsule  ondansetron (ZOFRAN ODT) 4 MG ODT tab          Review of Systems   Constitutional:  Negative for fever.   Cardiovascular:  Negative for chest pain.   Gastrointestinal:  Positive for abdominal pain, diarrhea and nausea. Negative for constipation and vomiting.   Genitourinary:  Negative for dysuria, hematuria, urgency and vaginal pain.   Musculoskeletal:  Positive for back pain.       Physical Exam   BP: (!) 155/94  Pulse: 88  Temp: 98.4  F (36.9  C)  Height: 170.2 cm (5' 7\")  Weight: 113.4 kg (250 lb)  SpO2: 98 %      Physical Exam  Vitals and nursing note reviewed.   Constitutional:       General: She is not in acute distress.     Appearance: She is obese. She is not diaphoretic.      Comments: Appears uncomfortable holding the right side of her back tearful but not in distress   HENT:      Head: Normocephalic and atraumatic.      Mouth/Throat:      Mouth: Mucous membranes are moist.   Eyes:      General: No scleral icterus.     Conjunctiva/sclera: Conjunctivae normal.   Cardiovascular:      Rate and Rhythm: Normal rate and regular rhythm.      Heart sounds: Normal heart sounds.   Pulmonary:      Effort: Pulmonary effort is normal. No respiratory distress.      Breath sounds: Normal breath sounds.   Abdominal:      General: Abdomen is flat.      Palpations: Abdomen is soft.      Comments: Right-sided flank tenderness to palpation   Musculoskeletal:      Cervical back: Neck supple.   Skin:     General: Skin is warm.      Findings: No rash.   Neurological:      General: No focal deficit present.      Mental Status: She is alert.         ED Course              ED Course as of 11/09/23 0912   Thu Nov 09, 2023   0748 Patient presents with right flank pain " radiating into her groin.  No prior history of kidney stones but it runs in her family's.  Pain is 8 out of 10.  Does not think she is pregnant but does not always use protection for birth control.  She is .  Denies concern for STD.     Procedures              Critical Care time:  none               Results for orders placed or performed during the hospital encounter of 11/09/23 (from the past 24 hour(s))   UA with Microscopic reflex to Culture    Specimen: Urine, Clean Catch   Result Value Ref Range    Color Urine Light Yellow Colorless, Straw, Light Yellow, Yellow    Appearance Urine Clear Clear    Glucose Urine Negative Negative mg/dL    Bilirubin Urine Negative Negative    Ketones Urine Negative Negative mg/dL    Specific Gravity Urine 1.025 1.000 - 1.030    Blood Urine Small (A) Negative    pH Urine 5.0 5.0 - 9.0    Protein Albumin Urine 10 (A) Negative mg/dL    Urobilinogen Urine Normal Normal, 2.0 mg/dL    Nitrite Urine Negative Negative    Leukocyte Esterase Urine Negative Negative    Mucus Urine Present (A) None Seen /LPF    RBC Urine 2 <=2 /HPF    WBC Urine 2 <=5 /HPF    Squamous Epithelials Urine 2 (H) <=1 /HPF    Narrative    Urine Culture not indicated   HCG qualitative urine   Result Value Ref Range    hCG Urine Qualitative Negative Negative   [facility]    Narrative    The following orders were created for panel order [facility].  Procedure                               Abnormality         Status                     ---------                               -----------         ------                     Extra Blue Top Tube[[id number]]                              In process                 Extra Red Top Tube[[id number]]                               In process                 Extra Green Top (Lithium...[[id number]]                      In process                 Extra Green Top (Lithium...[[id number]]                                                 Extra Purple Top Tube[[id number]]                                                          Please view results for these tests on the individual orders.   CBC with platelets differential    Narrative    The following orders were created for panel order CBC with platelets differential.  Procedure                               Abnormality         Status                     ---------                               -----------         ------                     CBC with platelets and d...[[id number]]                      Final result                 Please view results for these tests on the individual orders.   Comprehensive metabolic panel   Result Value Ref Range    Sodium 137 135 - 145 mmol/L    Potassium 4.0 3.4 - 5.3 mmol/L    Carbon Dioxide (CO2) 21 (L) 22 - 29 mmol/L    Anion Gap 11 7 - 15 mmol/L    Urea Nitrogen 15.6 6.0 - 20.0 mg/dL    Creatinine 0.79 0.51 - 0.95 mg/dL    GFR Estimate >90 >60 mL/min/1.73m2    Calcium 9.5 8.6 - 10.0 mg/dL    Chloride 105 98 - 107 mmol/L    Glucose 123 (H) 70 - 99 mg/dL    Alkaline Phosphatase 83 35 - 104 U/L    AST 22 0 - 45 U/L    ALT 28 0 - 50 U/L    Protein Total 7.3 6.4 - 8.3 g/dL    Albumin 4.3 3.5 - 5.2 g/dL    Bilirubin Total 0.5 <=1.2 mg/dL   CBC with platelets and differential   Result Value Ref Range    WBC Count 8.0 4.0 - 11.0 10e3/uL    RBC Count 4.87 3.80 - 5.20 10e6/uL    Hemoglobin 13.8 11.7 - 15.7 g/dL    Hematocrit 39.9 35.0 - 47.0 %    MCV 82 78 - 100 fL    MCH 28.3 26.5 - 33.0 pg    MCHC 34.6 31.5 - 36.5 g/dL    RDW 11.6 10.0 - 15.0 %    Platelet Count 384 150 - 450 10e3/uL    % Neutrophils 61 %    % Lymphocytes 32 %    % Monocytes 5 %    % Eosinophils 1 %    % Basophils 1 %    % Immature Granulocytes 0 %    NRBCs per 100 WBC 0 <1 /100    Absolute Neutrophils 4.9 1.6 - 8.3 10e3/uL    Absolute Lymphocytes 2.6 0.8 - 5.3 10e3/uL    Absolute Monocytes 0.4 0.0 - 1.3 10e3/uL    Absolute Eosinophils 0.1 0.0 - 0.7 10e3/uL    Absolute Basophils 0.1 0.0 - 0.2 10e3/uL    Absolute Immature Granulocytes 0.0 <=0.4 10e3/uL     Absolute NRBCs 0.0 10e3/uL   CT Abdomen Pelvis w/o Contrast    Narrative    EXAMINATION: CT ABDOMEN PELVIS W/O CONTRAST, 11/9/2023 8:44 AM    TECHNIQUE:  Helical CT images from the lung bases through the  symphysis pubis were obtained  without IV contrast. Contrast dose:    COMPARISON: none    HISTORY: R flank pain    FINDINGS:    The lung bases are clear    The liver is free of masses or biliary ductal enlargement. No  calcified gallstones are seen.    The the spleen and pancreas appear normal.    The adrenal glands are normal.    The right and left kidneys are free of masses or hydronephrosis. There  are no renal calculi. There is a 2 mm in diameter calculus at the  ureterovesical junction on the right.    The periaortic lymph nodes are normal in caliber.    No intraperitoneal masses or inflammatory changes are noted. The  appendix is normal    In the pelvis the bladder and rectum appear normal.    The regional skeleton is intact      Impression    IMPRESSION: 2 mm distal right ureteric calculus at the ureterovesical  junction     JUANA RODRÍGUEZ MD         SYSTEM ID:  [id number]       Medications   ondansetron (ZOFRAN) injection 4 mg (has no administration in time range)   sodium chloride 0.9% BOLUS 1,000 mL (1,000 mLs Intravenous $New Bag 11/9/23 0804)   ketorolac (TORADOL) injection 15 mg (15 mg Intravenous $Given 11/9/23 0801)   HYDROmorphone (PF) (DILAUDID) injection 0.5 mg (0.5 mg Intravenous $Given 11/9/23 0802)       Assessments & Plan (with Medical Decision Making)     I have reviewed the nursing notes.    I have reviewed the findings, diagnosis, plan and need for follow up with the patient.           Medical Decision Making  The patient's presentation was of low complexity (2+ clearly self-limited or minor problems).    The patient's evaluation involved:  ordering and/or review of 3+ test(s) in this encounter (see separate area of note for details)    The patient's management necessitated moderate risk  (prescription drug management including medications given in the ED).        New Prescriptions    KETOROLAC (TORADOL) 10 MG TABLET    Take 1 tablet (10 mg) by mouth every 6 hours as needed for moderate pain    TAMSULOSIN (FLOMAX) 0.4 MG CAPSULE    Take 1 capsule (0.4 mg) by mouth daily for 3 doses       Final diagnoses:   Ureterolithiasis   Discussed plan of care.  Toradol and Flomax sent to pharmacy.  She has a ride home from her mom.  Follow-up with PCP as needed.  Kidney stone diet discussed.  All questions answered.  Her pain was significantly improved at time of discharge she has no acute concern for infection.  She is not febrile.  Her vital signs are otherwise stable.  She will return if any new or worsening symptoms so I suspect that it she will pass in the next day or 2 given its location of the ureteral vesicular junction.    11/9/2023   [facility]       Nicole Velez DO  11/09/23 0912

## 2023-11-09 NOTE — DISCHARGE INSTRUCTIONS
You have a 2 mm stone that passed in your kidney anterior ureter down into your bladder.  It is almost all the way passed through.  These are very painful.  I have given you some medication to use at home if you continue to have discomfort.  You may have spasms in a little bit of pain when you pee until you pass the stone.  If you start to have recurrent kidney stones you may want to check in with your doctor to see if there are other interventions that may be necessary.  Sometimes dietary modification can be helpful.

## 2023-11-09 NOTE — ED TRIAGE NOTES
"Patient woke up at 0420 this am with right lower back pain, urinary frequency, and diarrhea. 8/10 sharp lower right back pain.No pain meds taken today.  BP (!) 155/94   Pulse 88   Temp 98.4  F (36.9  C) (Tympanic)   Ht 1.702 m (5' 7\")   Wt 113.4 kg (250 lb)   SpO2 98%   BMI 39.16 kg/m    Mary Pacheco RN on 11/9/2023 at 6:38 AM       Triage Assessment (Adult)       Row Name 11/09/23 0636          Triage Assessment    Airway WDL WDL        Respiratory WDL    Respiratory WDL WDL        Skin Circulation/Temperature WDL    Skin Circulation/Temperature WDL WDL        Cardiac WDL    Cardiac WDL WDL        Peripheral/Neurovascular WDL    Peripheral Neurovascular WDL WDL        Cognitive/Neuro/Behavioral WDL    Cognitive/Neuro/Behavioral WDL WDL                     "

## 2023-11-09 NOTE — Clinical Note
Kelsie Danielle was seen and treated in our emergency department on 11/9/2023.  She may return to work on 11/11/2023.       If you have any questions or concerns, please don't hesitate to call.      Nicole Velez, DO

## 2023-12-19 ENCOUNTER — MYC REFILL (OUTPATIENT)
Dept: FAMILY MEDICINE | Facility: OTHER | Age: 23
End: 2023-12-19
Payer: COMMERCIAL

## 2023-12-19 DIAGNOSIS — N94.6 DYSMENORRHEA: ICD-10-CM

## 2023-12-20 RX ORDER — ONDANSETRON 4 MG/1
4 TABLET, ORALLY DISINTEGRATING ORAL EVERY 8 HOURS PRN
Qty: 20 TABLET | Refills: 0 | OUTPATIENT
Start: 2023-12-20

## 2023-12-20 RX ORDER — ONDANSETRON 4 MG/1
4 TABLET, ORALLY DISINTEGRATING ORAL EVERY 8 HOURS PRN
Qty: 20 TABLET | Refills: 0 | Status: SHIPPED | OUTPATIENT
Start: 2023-12-20 | End: 2023-12-26

## 2023-12-20 NOTE — TELEPHONE ENCOUNTER
Patient comment: Ran out of this medication, period is still making me extremely nauseous.     Requested Prescriptions   Pending Prescriptions Disp Refills    ondansetron (ZOFRAN ODT) 4 MG ODT tab 20 tablet 2     Sig: Take 1 tablet (4 mg) by mouth every 8 hours as needed for nausea   Last Prescription Date:   12/28/22  Last Fill Qty/Refills:         20, R-2    Last Office Visit:              12/28/22   Future Office visit:           None    Pt due for annual exam.     Prescription approved per [facility] Refill Protocol.    Sissy Mccormick RN .............. 12/20/2023  1:43 PM

## 2023-12-22 ENCOUNTER — MYC MEDICAL ADVICE (OUTPATIENT)
Dept: FAMILY MEDICINE | Facility: OTHER | Age: 23
End: 2023-12-22
Payer: COMMERCIAL

## 2023-12-22 DIAGNOSIS — N94.6 DYSMENORRHEA: ICD-10-CM

## 2023-12-26 RX ORDER — ONDANSETRON 4 MG/1
4 TABLET, ORALLY DISINTEGRATING ORAL EVERY 8 HOURS PRN
Qty: 20 TABLET | Refills: 0 | Status: SHIPPED | OUTPATIENT
Start: 2023-12-26

## 2024-08-16 ENCOUNTER — OFFICE VISIT (OUTPATIENT)
Dept: FAMILY MEDICINE | Facility: OTHER | Age: 24
End: 2024-08-16
Payer: COMMERCIAL

## 2024-08-16 VITALS
HEART RATE: 103 BPM | HEIGHT: 67 IN | OXYGEN SATURATION: 95 % | TEMPERATURE: 99.4 F | SYSTOLIC BLOOD PRESSURE: 134 MMHG | WEIGHT: 277 LBS | RESPIRATION RATE: 20 BRPM | BODY MASS INDEX: 43.47 KG/M2 | DIASTOLIC BLOOD PRESSURE: 88 MMHG

## 2024-08-16 DIAGNOSIS — R05.1 ACUTE COUGH: ICD-10-CM

## 2024-08-16 DIAGNOSIS — B34.9 VIRAL ILLNESS: Primary | ICD-10-CM

## 2024-08-16 DIAGNOSIS — R42 DIZZINESS: ICD-10-CM

## 2024-08-16 DIAGNOSIS — R06.09 DYSPNEA ON EXERTION: ICD-10-CM

## 2024-08-16 DIAGNOSIS — J34.89 RHINORRHEA: ICD-10-CM

## 2024-08-16 DIAGNOSIS — R52 GENERALIZED BODY ACHES: ICD-10-CM

## 2024-08-16 DIAGNOSIS — H93.8X3 SENSATION OF FULLNESS IN BOTH EARS: ICD-10-CM

## 2024-08-16 DIAGNOSIS — J02.9 SORE THROAT: ICD-10-CM

## 2024-08-16 DIAGNOSIS — R11.0 NAUSEA: ICD-10-CM

## 2024-08-16 DIAGNOSIS — J35.8 TONSILLAR EXUDATE: ICD-10-CM

## 2024-08-16 DIAGNOSIS — R42 LIGHTHEADED: ICD-10-CM

## 2024-08-16 DIAGNOSIS — J35.1 ENLARGED TONSILS: ICD-10-CM

## 2024-08-16 DIAGNOSIS — R50.9 FEVER, UNSPECIFIED FEVER CAUSE: ICD-10-CM

## 2024-08-16 LAB — GROUP A STREP BY PCR: NOT DETECTED

## 2024-08-16 PROCEDURE — 99213 OFFICE O/P EST LOW 20 MIN: CPT

## 2024-08-16 PROCEDURE — 250N000009 HC RX 250

## 2024-08-16 PROCEDURE — 87070 CULTURE OTHR SPECIMN AEROBIC: CPT | Mod: ZL

## 2024-08-16 PROCEDURE — 87651 STREP A DNA AMP PROBE: CPT | Mod: ZL

## 2024-08-16 RX ORDER — DULOXETIN HYDROCHLORIDE 20 MG/1
20 CAPSULE, DELAYED RELEASE ORAL
COMMUNITY
Start: 2024-04-26

## 2024-08-16 RX ORDER — DEXAMETHASONE SODIUM PHOSPHATE 4 MG/ML
10 VIAL (ML) INJECTION ONCE
Status: COMPLETED | OUTPATIENT
Start: 2024-08-16 | End: 2024-08-16

## 2024-08-16 RX ADMIN — DEXAMETHASONE SODIUM PHOSPHATE 10 MG: 4 INJECTION, SOLUTION INTRAMUSCULAR; INTRAVENOUS at 14:36

## 2024-08-16 ASSESSMENT — PAIN SCALES - GENERAL: PAINLEVEL: SEVERE PAIN (6)

## 2024-08-16 NOTE — NURSING NOTE
"Pt presents to  for sore throat, fever and cough x2 days. Last dose Tylenol 0600.    Chief Complaint   Patient presents with    Pharyngitis    Fatigue    Fever       FOOD SECURITY SCREENING QUESTIONS  Hunger Vital Signs:  Within the past 12 months we worried whether our food would run out before we got money to buy more. Never  Within the past 12 months the food we bought just didn't last and we didn't have money to get more. Never  Sissy Deaedilon 8/16/2024 1:51 PM      Initial /88 (BP Location: Left arm, Patient Position: Sitting, Cuff Size: Adult Large)   Pulse 103   Temp 99.4  F (37.4  C) (Tympanic)   Resp 20   Ht 1.702 m (5' 7\")   Wt 125.6 kg (277 lb)   LMP  (LMP Unknown)   SpO2 95%   BMI 43.38 kg/m   Estimated body mass index is 43.38 kg/m  as calculated from the following:    Height as of this encounter: 1.702 m (5' 7\").    Weight as of this encounter: 125.6 kg (277 lb).  Medication Reconciliation: complete    Sissymyles Frank  "

## 2024-08-16 NOTE — PROGRESS NOTES
ASSESSMENT/PLAN:    I have reviewed the nursing notes.  I have reviewed the findings, diagnosis, plan and need for follow up with the patient.    1. Sore throat  2. Fever, unspecified fever cause  3. Dyspnea on exertion  4. Generalized body aches  5. Acute cough  6. Lightheaded  7. Dizziness  8. Nausea  9. Sensation of fullness in both ears  10. Rhinorrhea  11. Enlarged tonsils  12. Tonsillar exudate    - Group A Streptococcus PCR Throat Swab- negative strep test    - Swab Aerobic Bacterial Culture Routine With Gram Stain- results pending    - dexAMETHasone (DECADRON) injectable solution used ORALLY 10 mg- administered in clinic    13. Viral illness    - Please read the attached information on viral infections and sore throat for at home care treatment.    - Discussed with patient that symptoms and exam are consistent with viral illness.  Discussed that symptomatic treatment is appropriate but not with antibiotics at this time.  Will await throat culture results.       - Symptomatic treatment - Encouraged fluids, salt water gargles, honey (only if greater than 1 year in age due to risk of botulism), elevation, humidifier, sinus rinse/netti pot, lozenges, tea, topical vapor rub, popsicles, rest, etc     - May use over-the-counter Tylenol and ibuprofen as needed for pain, inflammation or fever    - Discussed warning signs/symptoms indicative of need to f/u    - Follow up if symptoms persist or worsen or concerns    - I explained my diagnostic considerations and recommendations to the patient, who voiced understanding and agreement with the treatment plan. All questions were answered. We discussed potential side effects of any prescribed or recommended therapies, as well as expectations for response to treatments.    DEEPA Gregg CNP  8/16/2024  1:38 PM    HPI:    Kelsie Mendez is a 23 year old female who presents to [facility] today for concerns of sore throat, shortness of breath, fever, fatigue, body aches,  cough, lightheadedness, dizziness, nausea, sensation of fullness in ears, rhinorrhea for the past 2 days.  Been taking Tylenol and water for at home care treatment.  Denies chest pain, congestion, otalgia, rash, headache, vomiting, diarrhea or constipation.  Declines COVID testing today.    Past Medical History:   Diagnosis Date    Acute suppurative otitis media without spontaneous rupture of tympanic membrane     2003,Left    Fever     2008,Lyme and Anaplasma titers pending.     No past surgical history on file.  Social History     Tobacco Use    Smoking status: Never    Smokeless tobacco: Never   Substance Use Topics    Alcohol use: Not Currently     Current Outpatient Medications   Medication Sig Dispense Refill    ALPRAZolam (XANAX) 1 MG tablet Take 1 tablet (1 mg) by mouth daily as needed for anxiety 30 tablet 2    DULoxetine (CYMBALTA) 20 MG capsule Take 20 mg by mouth      etonogestrel (NEXPLANON) 68 MG IMPL Inject 68 mg subcutaneously      escitalopram (LEXAPRO) 5 MG tablet Take 1 tablet (5 mg) by mouth daily (Patient not taking: Reported on 8/16/2024) 30 tablet 5    hydrOXYzine (VISTARIL) 50 MG capsule Take 1 capsule (50 mg) by mouth 2 times daily as needed for itching (Patient not taking: Reported on 8/16/2024) 60 capsule 2    ketorolac (TORADOL) 10 MG tablet Take 1 tablet (10 mg) by mouth every 6 hours as needed for moderate pain (Patient not taking: Reported on 8/16/2024) 20 tablet 0    ondansetron (ZOFRAN ODT) 4 MG ODT tab DISSOLVE 1 TABLET IN MOUTH EVERY 8 HOURS AS NEEDED FOR NAUSEA (Patient not taking: Reported on 8/16/2024) 20 tablet 0     Allergies   Allergen Reactions    Amoxicillin Hives and Rash     Past medical history, past surgical history, current medications and allergies reviewed and accurate to the best of my knowledge.      ROS:  Refer to HPI    /88 (BP Location: Left arm, Patient Position: Sitting, Cuff Size: Adult Large)   Pulse 103   Temp 99.4  F (37.4  C) (Tympanic)   Resp 20  "  Ht 1.702 m (5' 7\")   Wt 125.6 kg (277 lb)   LMP  (LMP Unknown)   SpO2 95%   BMI 43.38 kg/m      EXAM:  General Appearance: Well appearing 23 year old female, appropriate appearance for age. No acute distress   Ears: Left TM intact, translucent with bony landmarks appreciated, no erythema, no effusion, no bulging, no purulence.  Right TM intact, translucent with bony landmarks appreciated, no erythema, no effusion, no bulging, no purulence.  Left auditory canal clear.  Right auditory canal clear.  Normal external ears, non tender.  Eyes: conjunctivae normal without erythema or irritation, corneas clear, no drainage or crusting, no eyelid swelling, pupils equal   Oropharynx: moist mucous membranes, posterior pharynx with erythema, tonsils symmetric and 3+, + erythema, + exudates, no petechiae, no post nasal drip seen, no trismus, voice clear.    Sinuses:  No sinus tenderness upon palpation of the frontal or maxillary sinuses  Nose:  Bilateral nares: no erythema, no edema, + drainage, no congestion   Neck: supple without adenopathy  Respiratory: normal chest wall and respirations.  Normal effort.  Clear to auscultation bilaterally, no wheezing, crackles or rhonchi.  No increased work of breathing.  No cough appreciated.  Cardiac: RRR with no murmurs  Musculoskeletal:  Equal movement of bilateral upper extremities.  Equal movement of bilateral lower extremities.  Normal gait.    Neuro: Alert and oriented to person, place, and time.    Psychological: normal affect, alert, oriented, and pleasant.     Labs:  Results for orders placed or performed in visit on 08/16/24   Group A Streptococcus PCR Throat Swab     Status: Normal    Specimen: Throat; Swab   Result Value Ref Range    Group A strep by PCR Not Detected Not Detected    Narrative    The Xpert Xpress Strep A test, performed on the [employer], is a rapid, qualitative in vitro diagnostic test for the detection of Streptococcus pyogenes (Group " A ß-hemolytic Streptococcus, Strep A) in throat swab specimens from patients with signs and symptoms of pharyngitis. The Xpert Xpress Strep A test can be used as an aid in the diagnosis of Group A Streptococcal pharyngitis. The assay is not intended to monitor treatment for Group A Streptococcus infections. The Xpert Xpress Strep A test utilizes an automated real-time polymerase chain reaction (PCR) to detect Streptococcus pyogenes DNA.

## 2024-08-18 LAB — BACTERIA SPEC CULT: NORMAL

## 2024-09-21 ENCOUNTER — HEALTH MAINTENANCE LETTER (OUTPATIENT)
Age: 24
End: 2024-09-21

## 2025-07-03 ENCOUNTER — APPOINTMENT (OUTPATIENT)
Dept: LAB | Facility: OTHER | Age: 25
End: 2025-07-03

## (undated) RX ORDER — ONDANSETRON 2 MG/ML
INJECTION INTRAMUSCULAR; INTRAVENOUS
Status: DISPENSED
Start: 2023-11-09

## (undated) RX ORDER — LIDOCAINE HYDROCHLORIDE 10 MG/ML
INJECTION, SOLUTION INFILTRATION; PERINEURAL
Status: DISPENSED
Start: 2018-05-10

## (undated) RX ORDER — HYDROMORPHONE HYDROCHLORIDE 1 MG/ML
INJECTION, SOLUTION INTRAMUSCULAR; INTRAVENOUS; SUBCUTANEOUS
Status: DISPENSED
Start: 2023-11-09

## (undated) RX ORDER — KETOROLAC TROMETHAMINE 15 MG/ML
INJECTION, SOLUTION INTRAMUSCULAR; INTRAVENOUS
Status: DISPENSED
Start: 2023-11-09

## (undated) RX ORDER — KETOROLAC TROMETHAMINE 30 MG/ML
INJECTION, SOLUTION INTRAMUSCULAR; INTRAVENOUS
Status: DISPENSED
Start: 2018-03-26

## (undated) RX ORDER — DEXAMETHASONE SODIUM PHOSPHATE 4 MG/ML
INJECTION, SOLUTION INTRA-ARTICULAR; INTRALESIONAL; INTRAMUSCULAR; INTRAVENOUS; SOFT TISSUE
Status: DISPENSED
Start: 2024-08-16